# Patient Record
Sex: MALE | Race: WHITE | NOT HISPANIC OR LATINO | Employment: FULL TIME | URBAN - METROPOLITAN AREA
[De-identification: names, ages, dates, MRNs, and addresses within clinical notes are randomized per-mention and may not be internally consistent; named-entity substitution may affect disease eponyms.]

---

## 2021-02-25 DIAGNOSIS — Z23 IMMUNIZATION DUE: ICD-10-CM

## 2021-11-15 ENCOUNTER — TRANSCRIBE ORDERS (OUTPATIENT)
Dept: OTHER | Facility: OTHER | Age: 61
End: 2021-11-15

## 2021-11-15 ENCOUNTER — HOSPITAL ENCOUNTER (OUTPATIENT)
Dept: GENERAL RADIOLOGY | Facility: HOSPITAL | Age: 61
Discharge: HOME | End: 2021-11-15
Admitting: NURSE PRACTITIONER

## 2021-11-15 DIAGNOSIS — M54.50 LOW BACK PAIN, UNSPECIFIED BACK PAIN LATERALITY, UNSPECIFIED CHRONICITY, UNSPECIFIED WHETHER SCIATICA PRESENT: ICD-10-CM

## 2021-11-15 DIAGNOSIS — M54.50 LOW BACK PAIN, UNSPECIFIED BACK PAIN LATERALITY, UNSPECIFIED CHRONICITY, UNSPECIFIED WHETHER SCIATICA PRESENT: Primary | ICD-10-CM

## 2021-11-15 PROCEDURE — 72110 X-RAY EXAM L-2 SPINE 4/>VWS: CPT

## 2021-11-15 PROCEDURE — 72202 X-RAY EXAM SI JOINTS 3/> VWS: CPT | Performed by: RADIOLOGY

## 2021-11-15 PROCEDURE — 72110 X-RAY EXAM L-2 SPINE 4/>VWS: CPT | Performed by: RADIOLOGY

## 2021-11-15 PROCEDURE — 72202 X-RAY EXAM SI JOINTS 3/> VWS: CPT

## 2024-09-27 ENCOUNTER — APPOINTMENT (OUTPATIENT)
Dept: GENERAL RADIOLOGY | Facility: HOSPITAL | Age: 64
End: 2024-09-27
Payer: COMMERCIAL

## 2024-09-27 ENCOUNTER — HOSPITAL ENCOUNTER (INPATIENT)
Facility: HOSPITAL | Age: 64
LOS: 3 days | Discharge: HOME | End: 2024-09-30
Attending: FAMILY MEDICINE | Admitting: FAMILY MEDICINE
Payer: COMMERCIAL

## 2024-09-27 ENCOUNTER — APPOINTMENT (OUTPATIENT)
Dept: ULTRASOUND IMAGING | Facility: HOSPITAL | Age: 64
End: 2024-09-27
Payer: COMMERCIAL

## 2024-09-27 ENCOUNTER — APPOINTMENT (OUTPATIENT)
Dept: CT IMAGING | Facility: HOSPITAL | Age: 64
End: 2024-09-27
Payer: COMMERCIAL

## 2024-09-27 DIAGNOSIS — I26.99 OTHER ACUTE PULMONARY EMBOLISM WITHOUT ACUTE COR PULMONALE: ICD-10-CM

## 2024-09-27 DIAGNOSIS — I26.99 OTHER ACUTE PULMONARY EMBOLISM, UNSPECIFIED WHETHER ACUTE COR PULMONALE PRESENT: Primary | ICD-10-CM

## 2024-09-27 LAB
A-A DO2: 46.6 MMHG (ref 0–300)
ALBUMIN SERPL-MCNC: 4 G/DL (ref 3.5–5.2)
ALBUMIN/GLOB SERPL: 1.2 G/DL
ALP SERPL-CCNC: 92 U/L (ref 39–117)
ALT SERPL W P-5'-P-CCNC: 30 U/L (ref 1–41)
ANION GAP SERPL CALCULATED.3IONS-SCNC: 12.1 MMOL/L (ref 5–15)
APTT PPP: 29.6 SECONDS (ref 26.5–34.5)
ARTERIAL PATENCY WRIST A: ABNORMAL
AST SERPL-CCNC: 21 U/L (ref 1–40)
ATMOSPHERIC PRESS: 704 MMHG
BASE EXCESS BLDA CALC-SCNC: -0.2 MMOL/L (ref 0–2)
BASOPHILS # BLD AUTO: 0.07 10*3/MM3 (ref 0–0.2)
BASOPHILS # BLD AUTO: 0.08 10*3/MM3 (ref 0–0.2)
BASOPHILS NFR BLD AUTO: 0.5 % (ref 0–1.5)
BASOPHILS NFR BLD AUTO: 0.6 % (ref 0–1.5)
BDY SITE: ABNORMAL
BILIRUB SERPL-MCNC: 1.1 MG/DL (ref 0–1.2)
BUN SERPL-MCNC: 15 MG/DL (ref 8–23)
BUN/CREAT SERPL: 14 (ref 7–25)
CALCIUM SPEC-SCNC: 9.3 MG/DL (ref 8.6–10.5)
CHLORIDE SERPL-SCNC: 104 MMOL/L (ref 98–107)
CO2 BLDA-SCNC: 25 MMOL/L (ref 22–33)
CO2 SERPL-SCNC: 20.9 MMOL/L (ref 22–29)
COHGB MFR BLD: 1.5 % (ref 0–5)
CREAT SERPL-MCNC: 1.07 MG/DL (ref 0.76–1.27)
D DIMER PPP FEU-MCNC: 0.86 MCGFEU/ML (ref 0–0.64)
DEPRECATED RDW RBC AUTO: 41.2 FL (ref 37–54)
DEPRECATED RDW RBC AUTO: 41.5 FL (ref 37–54)
EGFRCR SERPLBLD CKD-EPI 2021: 77.5 ML/MIN/1.73
EOSINOPHIL # BLD AUTO: 0.02 10*3/MM3 (ref 0–0.4)
EOSINOPHIL # BLD AUTO: 0.13 10*3/MM3 (ref 0–0.4)
EOSINOPHIL NFR BLD AUTO: 0.1 % (ref 0.3–6.2)
EOSINOPHIL NFR BLD AUTO: 1 % (ref 0.3–6.2)
ERYTHROCYTE [DISTWIDTH] IN BLOOD BY AUTOMATED COUNT: 11.9 % (ref 12.3–15.4)
ERYTHROCYTE [DISTWIDTH] IN BLOOD BY AUTOMATED COUNT: 12.1 % (ref 12.3–15.4)
GEN 5 2HR TROPONIN T REFLEX: 10 NG/L
GLOBULIN UR ELPH-MCNC: 3.3 GM/DL
GLUCOSE SERPL-MCNC: 111 MG/DL (ref 65–99)
HCO3 BLDA-SCNC: 23.8 MMOL/L (ref 20–26)
HCT VFR BLD AUTO: 38.7 % (ref 37.5–51)
HCT VFR BLD AUTO: 41.4 % (ref 37.5–51)
HCT VFR BLD CALC: 42.5 % (ref 38–51)
HGB BLD-MCNC: 13.1 G/DL (ref 13–17.7)
HGB BLD-MCNC: 14 G/DL (ref 13–17.7)
HGB BLDA-MCNC: 13.9 G/DL (ref 14–18)
HOLD SPECIMEN: NORMAL
HOLD SPECIMEN: NORMAL
IMM GRANULOCYTES # BLD AUTO: 0.05 10*3/MM3 (ref 0–0.05)
IMM GRANULOCYTES # BLD AUTO: 0.06 10*3/MM3 (ref 0–0.05)
IMM GRANULOCYTES NFR BLD AUTO: 0.4 % (ref 0–0.5)
IMM GRANULOCYTES NFR BLD AUTO: 0.5 % (ref 0–0.5)
INHALED O2 CONCENTRATION: 21 %
INR PPP: 0.94 (ref 0.9–1.1)
LYMPHOCYTES # BLD AUTO: 2.66 10*3/MM3 (ref 0.7–3.1)
LYMPHOCYTES # BLD AUTO: 2.78 10*3/MM3 (ref 0.7–3.1)
LYMPHOCYTES NFR BLD AUTO: 19.3 % (ref 19.6–45.3)
LYMPHOCYTES NFR BLD AUTO: 21.1 % (ref 19.6–45.3)
Lab: ABNORMAL
Lab: ABNORMAL
MCH RBC QN AUTO: 31.6 PG (ref 26.6–33)
MCH RBC QN AUTO: 31.7 PG (ref 26.6–33)
MCHC RBC AUTO-ENTMCNC: 33.8 G/DL (ref 31.5–35.7)
MCHC RBC AUTO-ENTMCNC: 33.9 G/DL (ref 31.5–35.7)
MCV RBC AUTO: 93.3 FL (ref 79–97)
MCV RBC AUTO: 93.9 FL (ref 79–97)
METHGB BLD QL: 0.1 % (ref 0–3)
MODALITY: ABNORMAL
MONOCYTES # BLD AUTO: 1.73 10*3/MM3 (ref 0.1–0.9)
MONOCYTES # BLD AUTO: 1.83 10*3/MM3 (ref 0.1–0.9)
MONOCYTES NFR BLD AUTO: 12.5 % (ref 5–12)
MONOCYTES NFR BLD AUTO: 13.9 % (ref 5–12)
NEUTROPHILS NFR BLD AUTO: 62.9 % (ref 42.7–76)
NEUTROPHILS NFR BLD AUTO: 67.2 % (ref 42.7–76)
NEUTROPHILS NFR BLD AUTO: 8.31 10*3/MM3 (ref 1.7–7)
NEUTROPHILS NFR BLD AUTO: 9.28 10*3/MM3 (ref 1.7–7)
NOTIFIED BY: ABNORMAL
NOTIFIED WHO: ABNORMAL
NRBC BLD AUTO-RTO: 0 /100 WBC (ref 0–0.2)
NRBC BLD AUTO-RTO: 0 /100 WBC (ref 0–0.2)
NT-PROBNP SERPL-MCNC: 67.6 PG/ML (ref 0–900)
OXYHGB MFR BLDV: 85.8 % (ref 94–99)
PCO2 BLDA: 36.4 MM HG (ref 35–45)
PCO2 TEMP ADJ BLD: ABNORMAL MM[HG]
PH BLDA: 7.42 PH UNITS (ref 7.35–7.45)
PH, TEMP CORRECTED: ABNORMAL
PLATELET # BLD AUTO: 224 10*3/MM3 (ref 140–450)
PLATELET # BLD AUTO: 241 10*3/MM3 (ref 140–450)
PMV BLD AUTO: 8.8 FL (ref 6–12)
PMV BLD AUTO: 9.1 FL (ref 6–12)
PO2 BLDA: 50.3 MM HG (ref 83–108)
PO2 TEMP ADJ BLD: ABNORMAL MM[HG]
POTASSIUM SERPL-SCNC: 4.2 MMOL/L (ref 3.5–5.2)
PROT SERPL-MCNC: 7.3 G/DL (ref 6–8.5)
PROTHROMBIN TIME: 12.7 SECONDS (ref 12.1–14.7)
QT INTERVAL: 368 MS
QT INTERVAL: 374 MS
QTC INTERVAL: 467 MS
QTC INTERVAL: 479 MS
RBC # BLD AUTO: 4.15 10*6/MM3 (ref 4.14–5.8)
RBC # BLD AUTO: 4.41 10*6/MM3 (ref 4.14–5.8)
SAO2 % BLDCOA: 87.2 % (ref 94–99)
SODIUM SERPL-SCNC: 137 MMOL/L (ref 136–145)
TROPONIN T DELTA: 0 NG/L
TROPONIN T SERPL HS-MCNC: 10 NG/L
UFH PPP CHRO-ACNC: 0.7 IU/ML (ref 0.3–0.7)
UFH PPP CHRO-ACNC: <0.1 IU/ML (ref 0.3–0.7)
VENTILATOR MODE: ABNORMAL
WBC NRBC COR # BLD AUTO: 13.19 10*3/MM3 (ref 3.4–10.8)
WBC NRBC COR # BLD AUTO: 13.81 10*3/MM3 (ref 3.4–10.8)
WHOLE BLOOD HOLD COAG: NORMAL
WHOLE BLOOD HOLD SPECIMEN: NORMAL

## 2024-09-27 PROCEDURE — 84484 ASSAY OF TROPONIN QUANT: CPT | Performed by: FAMILY MEDICINE

## 2024-09-27 PROCEDURE — 85025 COMPLETE CBC W/AUTO DIFF WBC: CPT | Performed by: FAMILY MEDICINE

## 2024-09-27 PROCEDURE — 82375 ASSAY CARBOXYHB QUANT: CPT

## 2024-09-27 PROCEDURE — 71275 CT ANGIOGRAPHY CHEST: CPT | Performed by: RADIOLOGY

## 2024-09-27 PROCEDURE — 36415 COLL VENOUS BLD VENIPUNCTURE: CPT

## 2024-09-27 PROCEDURE — 99223 1ST HOSP IP/OBS HIGH 75: CPT

## 2024-09-27 PROCEDURE — 25010000002 MORPHINE PER 10 MG: Performed by: NURSE PRACTITIONER

## 2024-09-27 PROCEDURE — 93005 ELECTROCARDIOGRAM TRACING: CPT | Performed by: FAMILY MEDICINE

## 2024-09-27 PROCEDURE — 71045 X-RAY EXAM CHEST 1 VIEW: CPT | Performed by: RADIOLOGY

## 2024-09-27 PROCEDURE — 76705 ECHO EXAM OF ABDOMEN: CPT

## 2024-09-27 PROCEDURE — 71045 X-RAY EXAM CHEST 1 VIEW: CPT

## 2024-09-27 PROCEDURE — 85610 PROTHROMBIN TIME: CPT | Performed by: NURSE PRACTITIONER

## 2024-09-27 PROCEDURE — 25510000001 IOPAMIDOL PER 1 ML: Performed by: FAMILY MEDICINE

## 2024-09-27 PROCEDURE — 76705 ECHO EXAM OF ABDOMEN: CPT | Performed by: RADIOLOGY

## 2024-09-27 PROCEDURE — 25010000002 HEPARIN (PORCINE) 25000-0.45 UT/250ML-% SOLUTION: Performed by: NURSE PRACTITIONER

## 2024-09-27 PROCEDURE — 71275 CT ANGIOGRAPHY CHEST: CPT

## 2024-09-27 PROCEDURE — 85730 THROMBOPLASTIN TIME PARTIAL: CPT | Performed by: NURSE PRACTITIONER

## 2024-09-27 PROCEDURE — 85025 COMPLETE CBC W/AUTO DIFF WBC: CPT | Performed by: NURSE PRACTITIONER

## 2024-09-27 PROCEDURE — 25010000002 HYDROMORPHONE PER 4 MG: Performed by: NURSE PRACTITIONER

## 2024-09-27 PROCEDURE — 25010000002 HYDROMORPHONE 1 MG/ML SOLUTION: Performed by: NURSE PRACTITIONER

## 2024-09-27 PROCEDURE — 36600 WITHDRAWAL OF ARTERIAL BLOOD: CPT

## 2024-09-27 PROCEDURE — 25010000002 ONDANSETRON PER 1 MG: Performed by: NURSE PRACTITIONER

## 2024-09-27 PROCEDURE — 85520 HEPARIN ASSAY: CPT | Performed by: NURSE PRACTITIONER

## 2024-09-27 PROCEDURE — 25010000002 HEPARIN (PORCINE) PER 1000 UNITS: Performed by: NURSE PRACTITIONER

## 2024-09-27 PROCEDURE — 93010 ELECTROCARDIOGRAM REPORT: CPT | Performed by: INTERNAL MEDICINE

## 2024-09-27 PROCEDURE — 25810000003 SODIUM CHLORIDE 0.9 % SOLUTION: Performed by: FAMILY MEDICINE

## 2024-09-27 PROCEDURE — 80053 COMPREHEN METABOLIC PANEL: CPT | Performed by: FAMILY MEDICINE

## 2024-09-27 PROCEDURE — 85520 HEPARIN ASSAY: CPT | Performed by: FAMILY MEDICINE

## 2024-09-27 PROCEDURE — 99285 EMERGENCY DEPT VISIT HI MDM: CPT

## 2024-09-27 PROCEDURE — 83050 HGB METHEMOGLOBIN QUAN: CPT

## 2024-09-27 PROCEDURE — 81241 F5 GENE: CPT | Performed by: FAMILY MEDICINE

## 2024-09-27 PROCEDURE — 85520 HEPARIN ASSAY: CPT

## 2024-09-27 PROCEDURE — 83880 ASSAY OF NATRIURETIC PEPTIDE: CPT | Performed by: NURSE PRACTITIONER

## 2024-09-27 PROCEDURE — 85379 FIBRIN DEGRADATION QUANT: CPT | Performed by: PHYSICIAN ASSISTANT

## 2024-09-27 PROCEDURE — 82805 BLOOD GASES W/O2 SATURATION: CPT

## 2024-09-27 RX ORDER — HYDROMORPHONE HYDROCHLORIDE 1 MG/ML
0.5 INJECTION, SOLUTION INTRAMUSCULAR; INTRAVENOUS; SUBCUTANEOUS ONCE
Status: COMPLETED | OUTPATIENT
Start: 2024-09-27 | End: 2024-09-27

## 2024-09-27 RX ORDER — ASPIRIN 81 MG/1
324 TABLET, CHEWABLE ORAL ONCE
Status: COMPLETED | OUTPATIENT
Start: 2024-09-27 | End: 2024-09-27

## 2024-09-27 RX ORDER — SODIUM CHLORIDE 0.9 % (FLUSH) 0.9 %
10 SYRINGE (ML) INJECTION AS NEEDED
Status: DISCONTINUED | OUTPATIENT
Start: 2024-09-27 | End: 2024-09-30 | Stop reason: HOSPADM

## 2024-09-27 RX ORDER — BISACODYL 10 MG
10 SUPPOSITORY, RECTAL RECTAL DAILY PRN
Status: DISCONTINUED | OUTPATIENT
Start: 2024-09-27 | End: 2024-09-30 | Stop reason: HOSPADM

## 2024-09-27 RX ORDER — SODIUM CHLORIDE 9 MG/ML
40 INJECTION, SOLUTION INTRAVENOUS AS NEEDED
Status: DISCONTINUED | OUTPATIENT
Start: 2024-09-27 | End: 2024-09-30 | Stop reason: HOSPADM

## 2024-09-27 RX ORDER — ACETAMINOPHEN 325 MG/1
650 TABLET ORAL EVERY 6 HOURS PRN
Status: DISCONTINUED | OUTPATIENT
Start: 2024-09-27 | End: 2024-09-30 | Stop reason: HOSPADM

## 2024-09-27 RX ORDER — HEPARIN SODIUM 5000 [USP'U]/ML
4000 INJECTION, SOLUTION INTRAVENOUS; SUBCUTANEOUS AS NEEDED
Status: DISCONTINUED | OUTPATIENT
Start: 2024-09-27 | End: 2024-09-27

## 2024-09-27 RX ORDER — SODIUM CHLORIDE 9 MG/ML
100 INJECTION, SOLUTION INTRAVENOUS CONTINUOUS
Status: DISCONTINUED | OUTPATIENT
Start: 2024-09-27 | End: 2024-09-30

## 2024-09-27 RX ORDER — NITROGLYCERIN 0.4 MG/1
0.4 TABLET SUBLINGUAL
Status: DISCONTINUED | OUTPATIENT
Start: 2024-09-27 | End: 2024-09-30 | Stop reason: HOSPADM

## 2024-09-27 RX ORDER — LISINOPRIL 20 MG/1
20 TABLET ORAL DAILY
COMMUNITY

## 2024-09-27 RX ORDER — HEPARIN SODIUM 10000 [USP'U]/100ML
18.4 INJECTION, SOLUTION INTRAVENOUS
Status: DISCONTINUED | OUTPATIENT
Start: 2024-09-27 | End: 2024-09-30

## 2024-09-27 RX ORDER — BISACODYL 5 MG/1
5 TABLET, DELAYED RELEASE ORAL DAILY PRN
Status: DISCONTINUED | OUTPATIENT
Start: 2024-09-27 | End: 2024-09-30 | Stop reason: HOSPADM

## 2024-09-27 RX ORDER — AMOXICILLIN 250 MG
2 CAPSULE ORAL 2 TIMES DAILY PRN
Status: DISCONTINUED | OUTPATIENT
Start: 2024-09-27 | End: 2024-09-30 | Stop reason: HOSPADM

## 2024-09-27 RX ORDER — POLYETHYLENE GLYCOL 3350 17 G/17G
17 POWDER, FOR SOLUTION ORAL DAILY PRN
Status: DISCONTINUED | OUTPATIENT
Start: 2024-09-27 | End: 2024-09-30 | Stop reason: HOSPADM

## 2024-09-27 RX ORDER — IOPAMIDOL 755 MG/ML
100 INJECTION, SOLUTION INTRAVASCULAR
Status: COMPLETED | OUTPATIENT
Start: 2024-09-27 | End: 2024-09-27

## 2024-09-27 RX ORDER — CHOLECALCIFEROL (VITAMIN D3) 25 MCG
2000 TABLET ORAL DAILY
Status: DISCONTINUED | OUTPATIENT
Start: 2024-09-28 | End: 2024-09-30 | Stop reason: HOSPADM

## 2024-09-27 RX ORDER — SODIUM CHLORIDE 0.9 % (FLUSH) 0.9 %
10 SYRINGE (ML) INJECTION EVERY 12 HOURS SCHEDULED
Status: DISCONTINUED | OUTPATIENT
Start: 2024-09-27 | End: 2024-09-30 | Stop reason: HOSPADM

## 2024-09-27 RX ORDER — HYDROCODONE BITARTRATE AND ACETAMINOPHEN 5; 325 MG/1; MG/1
1 TABLET ORAL ONCE AS NEEDED
Status: COMPLETED | OUTPATIENT
Start: 2024-09-27 | End: 2024-09-27

## 2024-09-27 RX ORDER — CHOLECALCIFEROL (VITAMIN D3) 25 MCG
2000 TABLET ORAL DAILY
COMMUNITY

## 2024-09-27 RX ORDER — HEPARIN SODIUM 5000 [USP'U]/ML
2000 INJECTION, SOLUTION INTRAVENOUS; SUBCUTANEOUS AS NEEDED
Status: DISCONTINUED | OUTPATIENT
Start: 2024-09-27 | End: 2024-09-27

## 2024-09-27 RX ORDER — ONDANSETRON 2 MG/ML
4 INJECTION INTRAMUSCULAR; INTRAVENOUS ONCE
Status: COMPLETED | OUTPATIENT
Start: 2024-09-27 | End: 2024-09-27

## 2024-09-27 RX ORDER — HEPARIN SODIUM 5000 [USP'U]/ML
80 INJECTION, SOLUTION INTRAVENOUS; SUBCUTANEOUS ONCE
Status: COMPLETED | OUTPATIENT
Start: 2024-09-27 | End: 2024-09-27

## 2024-09-27 RX ORDER — LISINOPRIL 10 MG/1
20 TABLET ORAL DAILY
Status: DISCONTINUED | OUTPATIENT
Start: 2024-09-28 | End: 2024-09-30 | Stop reason: HOSPADM

## 2024-09-27 RX ADMIN — HYDROMORPHONE HYDROCHLORIDE 0.5 MG: 1 INJECTION, SOLUTION INTRAMUSCULAR; INTRAVENOUS; SUBCUTANEOUS at 11:18

## 2024-09-27 RX ADMIN — ASPIRIN 324 MG: 81 TABLET, CHEWABLE ORAL at 09:27

## 2024-09-27 RX ADMIN — SODIUM CHLORIDE 100 ML/HR: 9 INJECTION, SOLUTION INTRAVENOUS at 21:19

## 2024-09-27 RX ADMIN — HYDROMORPHONE HYDROCHLORIDE 0.5 MG: 1 INJECTION, SOLUTION INTRAMUSCULAR; INTRAVENOUS; SUBCUTANEOUS at 12:15

## 2024-09-27 RX ADMIN — HYDROCODONE BITARTRATE AND ACETAMINOPHEN 1 TABLET: 5; 325 TABLET ORAL at 23:32

## 2024-09-27 RX ADMIN — Medication 10 ML: at 21:20

## 2024-09-27 RX ADMIN — ACETAMINOPHEN 650 MG: 325 TABLET ORAL at 21:27

## 2024-09-27 RX ADMIN — MORPHINE SULFATE 4 MG: 4 INJECTION, SOLUTION INTRAMUSCULAR; INTRAVENOUS at 10:25

## 2024-09-27 RX ADMIN — HEPARIN SODIUM 15.4 UNITS/KG/HR: 10000 INJECTION, SOLUTION INTRAVENOUS at 12:10

## 2024-09-27 RX ADMIN — IOPAMIDOL 70 ML: 755 INJECTION, SOLUTION INTRAVENOUS at 10:41

## 2024-09-27 RX ADMIN — HYDROMORPHONE HYDROCHLORIDE 0.5 MG: 1 INJECTION, SOLUTION INTRAMUSCULAR; INTRAVENOUS; SUBCUTANEOUS at 20:08

## 2024-09-27 RX ADMIN — HYDROMORPHONE HYDROCHLORIDE 1 MG: 1 INJECTION, SOLUTION INTRAMUSCULAR; INTRAVENOUS; SUBCUTANEOUS at 14:30

## 2024-09-27 RX ADMIN — HEPARIN SODIUM 7800 UNITS: 5000 INJECTION INTRAVENOUS; SUBCUTANEOUS at 12:09

## 2024-09-27 RX ADMIN — ONDANSETRON 4 MG: 2 INJECTION INTRAMUSCULAR; INTRAVENOUS at 10:27

## 2024-09-27 NOTE — PROGRESS NOTES
HEPARIN INFUSION  Don Tavarez is a  64 y.o. male receiving heparin infusion.     Therapy for (VTE/Cardiac):   VTE  Patient Dosing Weight: 97.5 kg  Initial Bolus (Y/N):   Y  Any Bolus (Y/N):   Y        Signs or Symptoms of Bleeding: N        VTE (PE/DVT)   Initial Bolus: 80 units/kg (Max 10,000 units)  Initial rate: 18 units/kg/hr (Max 1,500 units/hr)    Anti Xa Rebolus Infusion Hold time Change infusion Dose (Units/kg/hr) Next Anti Xa Level Due   < 0.11 50 Units/kg  (4000 Units Max) None Increase by  4 Units/kg/hr 6 hours   0.11 - 0.19 25 Units/kg  (2000 Units Max) None Increase by  3 Units/kg/hr 6 hours   0.2 - 0.29 0 None Increase by  2 Units/kg/hr 6 hours   0.3 - 0.7 0 None No Change 6 hours (after 2 consecutive levels in range check q24h @0700)   0.71 - 0.8 0 None Decrease by  1 Units/kg/hr 6 hours   0.81 - 0.9 0 None Decrease by  2 Units/kg/hr 6 hours   0.91 - 1 0 60 Minutes Decrease by  3 Units/kg/hr 6 hours   >1 0 Hold  After Anti Xa less than 0.7 decrease previous rate by  4 Units/kg/hr  Every 2 hours until Anti Xa is less than 0.7 then when infusion restarts in 6 hours     Recommend anti-Xa every 6 hours.          Date   Time   Anti-Xa Current Rate (Unit/kg/hr) Bolus   (Units) Rate Change   (Unit/kg/hr) New Rate (Unit/kg/hr) Next   Anti-Xa Comments  Pump Check Daily   9/27 1122 <0.1  7800  15.4 1800 D/w JOSESITO Donis                                                                                                                                                                                                                                 Pharmacy will continue to follow anti-Xa results and monitor for signs and symptoms of bleeding or thrombosis.    Machelle Soto, PharmD  09/27/24 11:09 EDT

## 2024-09-27 NOTE — H&P
[facility] Medicine Services  History & Physical    Patient Identification:  Name:  Don Tavarez  Age:  64 y.o.  Sex:  male  :  1960  MRN:  [id number]   Visit Number:  [id number]  Admit Date: 2024   Primary Care Physician:  Jeanna eLvi APRN    Subjective     Chief complaint: Chest pain    History of presenting illness:      Don Tavarez is a 64 y.o. male who presented for further evaluation of chest pain.  He was seen and examined in the ED with son at the bedside.  He was uncomfortable appearing, having difficulty taking a deep breath and speaking in complete sentences.  He reports onset of right sided chest pain yesterday.  He reports a stabbing, sharp sensation-worse with breathing.  No cough or sputum production reported.  No recent illness. Was exposed to Covid 19 about two weeks ago but never symptomatic himself. No recent lower extremity edema.  No recent travel.  No prolonged immobilization or recent surgeries.  He is not a smoker.  No history of blood clots.  He reports he is otherwise generally healthy-only takes daily medication for blood pressure and arthritis.  No recent palpitations or chest pain otherwise.  Systems obtained and otherwise negative, see below.    Past medical history is significant for hypertension, arthritis    Upon arrival to the ED, vital signs were temp 98.7, heart rate 96, respirations 14, /80, SpO2 93% on room air.  ABG in the ED showed pO2 50.3 on room air.  HS troponin negative x 2.  D-dimer was elevated at 0.86.  White blood cell count is elevated at 13.19 without left shift.  CXR only notable for atelectasis, gallbladder US unremarkable.  CT PE protocol showed large pulmonary embolism right pulmonary artery.  EKG showed right bundle branch block.    Known Emergency Department medications received prior to my evaluation included aspirin, heparin, Dilaudid, Isovue, morphine, Zofran, heparin drip.   Emergency Department Room  location at the time of my evaluation was 403.     ---------------------------------------------------------------------------------------------------------------------   Review of Systems   Constitutional:  Negative for chills and fever.   HENT:  Negative for congestion and rhinorrhea.    Respiratory:  Positive for shortness of breath. Negative for cough.    Cardiovascular:  Positive for chest pain. Negative for palpitations and leg swelling.   Gastrointestinal:  Negative for abdominal pain, diarrhea, nausea and vomiting.   Genitourinary:  Negative for difficulty urinating and dysuria.   Musculoskeletal:  Negative for arthralgias and myalgias.   Skin:  Negative for rash and wound.   Neurological:  Negative for dizziness and light-headedness.        ---------------------------------------------------------------------------------------------------------------------   No past medical history on file.  No past surgical history on file.  No family history on file.  Social History     Socioeconomic History    Marital status:      ---------------------------------------------------------------------------------------------------------------------   Allergies:  Patient has no known allergies.  ---------------------------------------------------------------------------------------------------------------------   Home medications:    Medications below are reported home medications pulling from within the system; at this time, these medications have not been reconciled unless otherwise specified and are in the verification process for further verifcation as current home medications.  (Not in a hospital admission)      Hospital Scheduled Meds:     heparin, 15.4 Units/kg/hr (Dosing Weight), Last Rate: 15.4 Units/kg/hr (09/27/24 1210)  Pharmacy to Dose Heparin,         Current listed hospital scheduled medications may not yet reflect those currently placed in orders that are signed and held awaiting patient's arrival to  floor.   ---------------------------------------------------------------------------------------------------------------------     Objective     Vital Signs:  Temp:  [98.7 °F (37.1 °C)] 98.7 °F (37.1 °C)  Heart Rate:  [89-98] 98  Resp:  [14-18] 18  BP: (121-160)/() 136/92      09/27/24  0847   Weight: 97.5 kg (215 lb)     Body mass index is 32.69 kg/m².  ---------------------------------------------------------------------------------------------------------------------       Physical Exam  Vitals and nursing note reviewed.   Constitutional:       General: He is not in acute distress.     Appearance: He is ill-appearing.   HENT:      Head: Normocephalic and atraumatic.   Eyes:      Extraocular Movements: Extraocular movements intact.   Cardiovascular:      Rate and Rhythm: Normal rate and regular rhythm.   Pulmonary:      Effort: Pulmonary effort is normal.      Breath sounds: Normal breath sounds.      Comments: Very low inspiratory volumes due to pain  Abdominal:      Tenderness: There is no abdominal tenderness. There is no guarding.      Comments: Firm   Musculoskeletal:      Right lower leg: No edema.      Left lower leg: No edema.   Skin:     General: Skin is warm and dry.   Neurological:      Mental Status: He is alert. Mental status is at baseline.   Psychiatric:         Mood and Affect: Mood normal.             ---------------------------------------------------------------------------------------------------------------------  EKG:        I have personally looked at the EKG.  ---------------------------------------------------------------------------------------------------------------------   Results from last 7 days   Lab Units 09/27/24  0918   WBC 10*3/mm3 13.19*   HEMOGLOBIN g/dL 14.0   HEMATOCRIT % 41.4   MCV fL 93.9   MCHC g/dL 33.8   PLATELETS 10*3/mm3 241   INR  0.94     Results from last 7 days   Lab Units 09/27/24  1102   PH, ARTERIAL pH units 7.424   PO2 ART mm Hg 50.3*   PCO2, ARTERIAL mm  "Hg 36.4   HCO3 ART mmol/L 23.8     Results from last 7 days   Lab Units 09/27/24  0918   SODIUM mmol/L 137   POTASSIUM mmol/L 4.2   CHLORIDE mmol/L 104   CO2 mmol/L 20.9*   BUN mg/dL 15   CREATININE mg/dL 1.07   CALCIUM mg/dL 9.3   GLUCOSE mg/dL 111*   ALBUMIN g/dL 4.0   BILIRUBIN mg/dL 1.1   ALK PHOS U/L 92   AST (SGOT) U/L 21   ALT (SGPT) U/L 30   Estimated Creatinine Clearance: 78.9 mL/min (by C-G formula based on SCr of 1.07 mg/dL).  No results found for: \"AMMONIA\"  Results from last 7 days   Lab Units 09/27/24  1122 09/27/24  0918   HSTROP T ng/L 10 10     Results from last 7 days   Lab Units 09/27/24 0918   PROBNP pg/mL 67.6     No results found for: \"HGBA1C\"  No results found for: \"TSH\", \"FREET4\"  No results found for: \"PREGTESTUR\", \"PREGSERUM\", \"HCG\", \"HCGQUANT\"  Pain Management Panel           No data to display              No results found for: \"BLOODCX\"  No results found for: \"URINECX\"  No results found for: \"WOUNDCX\"  No results found for: \"STOOLCX\"      ---------------------------------------------------------------------------------------------------------------------  Imaging Results (Last 7 Days)       Procedure Component Value Units Date/Time    CT Angiogram Chest Pulmonary Embolism [[id number]] Collected: 09/27/24 1047     Updated: 09/27/24 1051    Narrative:      CT ANGIOGRAM CHEST PULMONARY EMBOLISM-     CLINICAL INDICATION: chest pain/pain with inspiration        COMPARISON: 9/27/2024     PROCEDURE: Thin cut axial images were acquired through the pulmonary  vessels during the rapid infusion of IV contrast.     Additional 3-D reformatted images obtained via post-processing for  improved diagnostic accuracy and procedural planning.     Radiation dose reduction techniques were utilized per ALARA protocol.  Automated exposure control was initiated through either or CareDose or  DoseRigMogoTix software packages by  protocol.           FINDINGS: Large filling defect in the right main pulmonary " "artery  extending superiorly and inferiorly compatible with large right  pulmonary embolus..     Minimal consolidation peripherally in the right upper lobe which could  represent postobstructive process..     There is no mediastinal lymph node enlargement     No pericardial or pleural effusion.          Impression:      1. Large pulmonary embolus right main pulmonary artery  2. Results were relayed to the referring clinician.     This report was finalized on 9/27/2024 10:49 AM by Dr. Stevo Farmer MD.       US Gallbladder [[id number]] Collected: 09/27/24 0956     Updated: 09/27/24 0958    Narrative:      US GALLBLADDER-     CLINICAL INDICATION: Right-sided pain        COMPARISON: None immediately available         PROCEDURE AND FINDINGS:     Sonographic imaging of the gallbladder reveals no evidence of  cholelithiasis.  There is no pericholecystic fluid.  The wall of the gallbladder measures 2.01 mm.  The common bile duct measures 5.51 mm.             Impression:      Impression:   No evidence of cholelithiasis.     This report was finalized on 9/27/2024 9:56 AM by Dr. Stevo Farmer MD.       XR Chest 1 View [[id number]] Collected: 09/27/24 0934     Updated: 09/27/24 0937    Narrative:      XR CHEST 1 VW-     CLINICAL INDICATION: Chest Pain Triage Protocol        COMPARISON: 10/6/2015     TECHNIQUE: Single frontal view of the chest.     FINDINGS:     LUNGS: Minimal right basilar airspace disease, likely atelectasis     HEART AND MEDIASTINUM: Heart and mediastinal contours are unremarkable        SKELETON: Bony and soft tissue structures are unremarkable.             Impression:      Minimal right basilar airspace disease, likely atelectasis           This report was finalized on 9/27/2024 9:35 AM by Dr. Stevo Farmer MD.               Cultures:  No results found for: \"BLOODCX\", \"URINECX\", \"WOUNDCX\", \"MRSACX\", \"RESPCX\", \"STOOLCX\"    Last echocardiogram:          I have personally reviewed the above radiology images and " read the final radiology report on 09/27/24  ---------------------------------------------------------------------------------------------------------------------  Assessment / Plan     There are no active hospital problems to display for this patient.      ASSESSMENT/PLAN:    Large acute right-sided pulmonary embolism  Acute respiratory failure with hypoxia, due to above  Patient presented to the ED for further evaluation of right-sided sharp, stabbing chest pain worse on breathing times past 24 hours.  pO2 on arrival was 50.3 on room air.  D-dimer was elevated at 0.86 and CT PE protocol showed large pulmonary embolism right main pulmonary artery.  No comment on the right heart strain.  No risk factors identified, see HPI.  EKG showed right bundle branch block  Patient was placed on supplemental O2 and a heparin infusion was initiated  Will admit for further management  Continue heparin infusion for now-pharmacy to dose, assistance appreciated.  Monitor per protocol.  Plan to transition to DOAC if affordable prior to discharge  Evaluate further with TTE  Will control pain as able  Monitor respiratory status closely.  Titrate supplemental O2 as needed and wean to baseline as able.  Trend labs  Continue supportive care measures    Hypertension  Review and resume home medications as clinically indicated  ----------  -DVT prophylaxis: Heparin drip  -Activity: As tolerated  -Expected length of stay: INPATIENT status due to the need for care which can only be reasonably provided in an hospital setting such as aggressive/expedited ancillary services and/or consultation services, the necessity for IV medications, close physician monitoring and/or the possible need for procedures.  In such, I feel patient’s risk for adverse outcomes and need for care warrant INPATIENT evaluation and predict the patient’s care encounter to likely last beyond 2 midnights.   -Disposition  pending further clinical course and work up     High  risk secondary to large pulmonary embolism     There are no questions and answers to display.       Jay Jay Goodman PA-C   09/27/24  12:55 EDT

## 2024-09-27 NOTE — ED NOTES
"Pt reports sudden onset of shortness of breath that started yesterday  he stated \"I have extreme pain when I take a deep breath\"  "

## 2024-09-27 NOTE — ED NOTES
MEDICAL SCREENING:    Reason for Visit: Right-sided chest pain.  This is worse with breathing.    Patient initially seen in triage.  The patient was advised further evaluation and diagnostic testing will be needed, some of the treatment and testing will be initiated in the lobby in order to begin the process.  The patient will be returned to the waiting area for the time being and possibly be re-assessed by a subsequent ED provider.  The patient will be brought back to the treatment area in as timely manner as possible.       Cam Marrero PA  09/27/24 0994

## 2024-09-27 NOTE — ED PROVIDER NOTES
Subjective   History of Present Illness  Patient is 64-year-old male who presents today complaining of right-sided chest pain.  Patient reports pain started yesterday.  Patient reports he took some ibuprofen and it relieved somewhat but states he awoke with worsening pain in the right lower chest area.  Patient reports mild shortness of breath.  Patient denies any diaphoresis.  Patient denies any history of cardiac issues.  Patient reports that his shortness of breath did seem to get some worse with exertion.  Patient does report a past history of hypertension.  Patient denies any alleviating factors.  Patient denies trying any interventions other than the ibuprofen.        Review of Systems   Constitutional: Negative.    HENT: Negative.     Eyes: Negative.    Respiratory:  Positive for shortness of breath.    Cardiovascular:  Positive for chest pain.   Gastrointestinal: Negative.    Endocrine: Negative.    Genitourinary: Negative.    Musculoskeletal: Negative.    Skin: Negative.    Allergic/Immunologic: Negative.    Neurological: Negative.    Hematological: Negative.    Psychiatric/Behavioral: Negative.         Past Medical History:   Diagnosis Date    Arthritis     Hypertension        No Known Allergies    Past Surgical History:   Procedure Laterality Date    JOINT REPLACEMENT         History reviewed. No pertinent family history.    Social History     Socioeconomic History    Marital status:    Tobacco Use    Smoking status: Never     Passive exposure: Never    Smokeless tobacco: Never   Vaping Use    Vaping status: Never Used   Substance and Sexual Activity    Alcohol use: Never    Drug use: Never    Sexual activity: Defer           Objective   Physical Exam  Vitals and nursing note reviewed.   Constitutional:       Appearance: He is well-developed.   HENT:      Head: Normocephalic.      Right Ear: External ear normal.      Left Ear: External ear normal.   Eyes:      Conjunctiva/sclera: Conjunctivae  normal.      Pupils: Pupils are equal, round, and reactive to light.   Cardiovascular:      Rate and Rhythm: Normal rate and regular rhythm.      Heart sounds: Normal heart sounds.   Pulmonary:      Effort: Pulmonary effort is normal.      Comments: Decreased lung sounds right lower lung field  Abdominal:      General: Bowel sounds are normal.      Palpations: Abdomen is soft.   Musculoskeletal:         General: Normal range of motion.      Cervical back: Normal range of motion and neck supple.   Skin:     General: Skin is warm and dry.      Capillary Refill: Capillary refill takes less than 2 seconds.   Neurological:      Mental Status: He is alert and oriented to person, place, and time.   Psychiatric:         Behavior: Behavior normal.         Thought Content: Thought content normal.         Procedures           ED Course  ED Course as of 09/27/24 2228   Fri Sep 27, 2024   0936 EKG normal sinus rhythm.  Ventricular 99 parable 152  QTc 479 no ST elevation.Electronically signed by Ray Nuñez MD, 09/27/24, 9:36 AM EDT.   [BB]   1051 Repeat EKG normal sinus rhythm at rate 97 parable 174  QTc 467 left axis deviation no ST elevation. Electronically signed by Ray Nuñez MD, 09/27/24, 10:52 AM EDT.   [BB]      ED Course User Index  [BB] Ray Nuñez MD                                   Results for orders placed or performed during the hospital encounter of 09/27/24   Comprehensive Metabolic Panel    Specimen: Arm, Left; Blood   Result Value Ref Range    Glucose 111 (H) 65 - 99 mg/dL    BUN 15 8 - 23 mg/dL    Creatinine 1.07 0.76 - 1.27 mg/dL    Sodium 137 136 - 145 mmol/L    Potassium 4.2 3.5 - 5.2 mmol/L    Chloride 104 98 - 107 mmol/L    CO2 20.9 (L) 22.0 - 29.0 mmol/L    Calcium 9.3 8.6 - 10.5 mg/dL    Total Protein 7.3 6.0 - 8.5 g/dL    Albumin 4.0 3.5 - 5.2 g/dL    ALT (SGPT) 30 1 - 41 U/L    AST (SGOT) 21 1 - 40 U/L    Alkaline Phosphatase 92 39 - 117 U/L    Total Bilirubin 1.1  0.0 - 1.2 mg/dL    Globulin 3.3 gm/dL    A/G Ratio 1.2 g/dL    BUN/Creatinine Ratio 14.0 7.0 - 25.0    Anion Gap 12.1 5.0 - 15.0 mmol/L    eGFR 77.5 >60.0 mL/min/1.73   High Sensitivity Troponin T    Specimen: Arm, Left; Blood   Result Value Ref Range    HS Troponin T 10 <22 ng/L   CBC Auto Differential    Specimen: Arm, Left; Blood   Result Value Ref Range    WBC 13.19 (H) 3.40 - 10.80 10*3/mm3    RBC 4.41 4.14 - 5.80 10*6/mm3    Hemoglobin 14.0 13.0 - 17.7 g/dL    Hematocrit 41.4 37.5 - 51.0 %    MCV 93.9 79.0 - 97.0 fL    MCH 31.7 26.6 - 33.0 pg    MCHC 33.8 31.5 - 35.7 g/dL    RDW 11.9 (L) 12.3 - 15.4 %    RDW-SD 41.2 37.0 - 54.0 fl    MPV 9.1 6.0 - 12.0 fL    Platelets 241 140 - 450 10*3/mm3    Neutrophil % 62.9 42.7 - 76.0 %    Lymphocyte % 21.1 19.6 - 45.3 %    Monocyte % 13.9 (H) 5.0 - 12.0 %    Eosinophil % 1.0 0.3 - 6.2 %    Basophil % 0.6 0.0 - 1.5 %    Immature Grans % 0.5 0.0 - 0.5 %    Neutrophils, Absolute 8.31 (H) 1.70 - 7.00 10*3/mm3    Lymphocytes, Absolute 2.78 0.70 - 3.10 10*3/mm3    Monocytes, Absolute 1.83 (H) 0.10 - 0.90 10*3/mm3    Eosinophils, Absolute 0.13 0.00 - 0.40 10*3/mm3    Basophils, Absolute 0.08 0.00 - 0.20 10*3/mm3    Immature Grans, Absolute 0.06 (H) 0.00 - 0.05 10*3/mm3    nRBC 0.0 0.0 - 0.2 /100 WBC   D-dimer, Quantitative    Specimen: Arm, Left; Blood   Result Value Ref Range    D-Dimer, Quantitative 0.86 (H) 0.00 - 0.64 MCGFEU/mL   High Sensitivity Troponin T 2Hr    Specimen: Arm, Right; Blood   Result Value Ref Range    HS Troponin T 10 <22 ng/L    Troponin T Delta 0 >=-4 - <+4 ng/L   Heparin Anti-Xa    Specimen: Arm, Right; Blood   Result Value Ref Range    Heparin Anti-Xa (UFH) <0.10 (L) 0.30 - 0.70 IU/ml   Protime-INR    Specimen: Arm, Left; Blood   Result Value Ref Range    Protime 12.7 12.1 - 14.7 Seconds    INR 0.94 0.90 - 1.10   aPTT    Specimen: Arm, Left; Blood   Result Value Ref Range    PTT 29.6 26.5 - 34.5 seconds   BNP    Specimen: Arm, Left; Blood   Result  Value Ref Range    proBNP 67.6 0.0 - 900.0 pg/mL   Blood Gas, Arterial With Co-Ox    Specimen: Arterial Blood   Result Value Ref Range    Site Left Brachial     Jordan's Test N/A     pH, Arterial 7.424 7.350 - 7.450 pH units    pCO2, Arterial 36.4 35.0 - 45.0 mm Hg    pO2, Arterial 50.3 (C) 83.0 - 108.0 mm Hg    HCO3, Arterial 23.8 20.0 - 26.0 mmol/L    Base Excess, Arterial -0.2 (L) 0.0 - 2.0 mmol/L    O2 Saturation, Arterial 87.2 (L) 94.0 - 99.0 %    Hemoglobin, Blood Gas 13.9 (L) 14 - 18 g/dL    Hematocrit, Blood Gas 42.5 38.0 - 51.0 %    Oxyhemoglobin 85.8 (L) 94 - 99 %    Methemoglobin 0.10 0.00 - 3.00 %    Carboxyhemoglobin 1.5 0 - 5 %    A-a DO2 46.6 0.0 - 300.0 mmHg    CO2 Content 25.0 22 - 33 mmol/L    Barometric Pressure for Blood Gas 704 mmHg    Modality Room Air     FIO2 21 %    Ventilator Mode NA     Notified Who ER NP AND RN     Notified By [id number]     Notified Time 09/27/2024 11:05     Collected by [id number]     pH, Temp Corrected      pCO2, Temperature Corrected      pO2, Temperature Corrected     Heparin Anti-Xa    Specimen: Blood   Result Value Ref Range    Heparin Anti-Xa (UFH) 0.70 0.30 - 0.70 IU/ml   ECG 12 Lead ED Triage Standing Order; Chest Pain   Result Value Ref Range    QT Interval 374 ms    QTC Interval 479 ms   ECG 12 Lead Other; shortness of brearth   Result Value Ref Range    QT Interval 368 ms    QTC Interval 467 ms   Green Top (Gel)   Result Value Ref Range    Extra Tube Hold for add-ons.    Lavender Top   Result Value Ref Range    Extra Tube hold for add-on    Gold Top - SST   Result Value Ref Range    Extra Tube Hold for add-ons.    Light Blue Top   Result Value Ref Range    Extra Tube Hold for add-ons.      CT Angiogram Chest Pulmonary Embolism   Final Result   1. Large pulmonary embolus right main pulmonary artery   2. Results were relayed to the referring clinician.       This report was finalized on 9/27/2024 10:49 AM by Dr. Stevo Farmer MD.          US Gallbladder   Final Result    Impression:    No evidence of cholelithiasis.       This report was finalized on 9/27/2024 9:56 AM by Dr. Stevo Farmer MD.          XR Chest 1 View   Final Result   Minimal right basilar airspace disease, likely atelectasis               This report was finalized on 9/27/2024 9:35 AM by Dr. Stevo Farmer MD.                      Medical Decision Making  Patient is 64-year-old male who presents today complaining of right-sided chest pain.  Patient reports pain started yesterday.  Patient reports he took some ibuprofen and it relieved somewhat but states he awoke with worsening pain in the right lower chest area.  Patient reports mild shortness of breath.  Patient denies any diaphoresis.  Patient denies any history of cardiac issues.  Patient reports that his shortness of breath did seem to get some worse with exertion.  Patient does report a past history of hypertension.  Patient denies any alleviating factors.  Patient denies trying any interventions other than the ibuprofen.      Problems Addressed:  Other acute pulmonary embolism, unspecified whether acute cor pulmonale present: complicated acute illness or injury    Amount and/or Complexity of Data Reviewed  Labs: ordered. Decision-making details documented in ED Course.  Radiology: ordered. Decision-making details documented in ED Course.  ECG/medicine tests: ordered. Decision-making details documented in ED Course.    Risk  OTC drugs.  Prescription drug management.  Decision regarding hospitalization.        Final diagnoses:   Other acute pulmonary embolism, unspecified whether acute cor pulmonale present       ED Disposition  ED Disposition       ED Disposition   Decision to Admit    Condition   --    Comment   Level of Care: Telemetry [5]   Diagnosis: Pulmonary embolism [[id number]]   Admitting Physician: MICHAEL MATT [[id number]]   Certification: I Certify That Inpatient Hospital Services Are Medically Necessary For Greater Than 2 Midnights                 No  follow-up provider specified.       Medication List      No changes were made to your prescriptions during this visit.            Mariano Clayton, APRN  09/27/24 [id number]

## 2024-09-28 ENCOUNTER — APPOINTMENT (OUTPATIENT)
Dept: CARDIOLOGY | Facility: HOSPITAL | Age: 64
End: 2024-09-28
Payer: COMMERCIAL

## 2024-09-28 ENCOUNTER — APPOINTMENT (OUTPATIENT)
Dept: ULTRASOUND IMAGING | Facility: HOSPITAL | Age: 64
End: 2024-09-28
Payer: COMMERCIAL

## 2024-09-28 LAB
ANION GAP SERPL CALCULATED.3IONS-SCNC: 11.3 MMOL/L (ref 5–15)
BH CV ECHO MEAS - ACS: 1.8 CM
BH CV ECHO MEAS - AO MAX PG: 5.6 MMHG
BH CV ECHO MEAS - AO MEAN PG: 3 MMHG
BH CV ECHO MEAS - AO ROOT DIAM: 3.6 CM
BH CV ECHO MEAS - AO V2 MAX: 118 CM/SEC
BH CV ECHO MEAS - AO V2 VTI: 21.6 CM
BH CV ECHO MEAS - AVA(I,D): 3 CM2
BH CV ECHO MEAS - EDV(CUBED): 97.3 ML
BH CV ECHO MEAS - EDV(MOD-SP2): 79.8 ML
BH CV ECHO MEAS - EDV(MOD-SP4): 96.9 ML
BH CV ECHO MEAS - EF(MOD-BP): 63.9 %
BH CV ECHO MEAS - EF(MOD-SP2): 63.9 %
BH CV ECHO MEAS - EF(MOD-SP4): 60.5 %
BH CV ECHO MEAS - ESV(CUBED): 29.8 ML
BH CV ECHO MEAS - ESV(MOD-SP2): 28.8 ML
BH CV ECHO MEAS - ESV(MOD-SP4): 38.3 ML
BH CV ECHO MEAS - FS: 32.6 %
BH CV ECHO MEAS - IVS/LVPW: 1 CM
BH CV ECHO MEAS - IVSD: 1.2 CM
BH CV ECHO MEAS - LA DIMENSION: 4.8 CM
BH CV ECHO MEAS - LAT PEAK E' VEL: 9.3 CM/SEC
BH CV ECHO MEAS - LV MASS(C)D: 205 GRAMS
BH CV ECHO MEAS - LV MAX PG: 5.5 MMHG
BH CV ECHO MEAS - LV MEAN PG: 2 MMHG
BH CV ECHO MEAS - LV V1 MAX: 117 CM/SEC
BH CV ECHO MEAS - LV V1 VTI: 20.7 CM
BH CV ECHO MEAS - LVIDD: 4.6 CM
BH CV ECHO MEAS - LVIDS: 3.1 CM
BH CV ECHO MEAS - LVOT AREA: 3.1 CM2
BH CV ECHO MEAS - LVOT DIAM: 2 CM
BH CV ECHO MEAS - LVPWD: 1.2 CM
BH CV ECHO MEAS - MED PEAK E' VEL: 7.3 CM/SEC
BH CV ECHO MEAS - MV A MAX VEL: 66.4 CM/SEC
BH CV ECHO MEAS - MV DEC SLOPE: 417 CM/SEC2
BH CV ECHO MEAS - MV DEC TIME: 0.14 SEC
BH CV ECHO MEAS - MV E MAX VEL: 58.7 CM/SEC
BH CV ECHO MEAS - MV E/A: 0.88
BH CV ECHO MEAS - PA ACC TIME: 0.13 SEC
BH CV ECHO MEAS - RAP SYSTOLE: 10 MMHG
BH CV ECHO MEAS - RVSP: 38.5 MMHG
BH CV ECHO MEAS - SV(LVOT): 65 ML
BH CV ECHO MEAS - SV(MOD-SP2): 51 ML
BH CV ECHO MEAS - SV(MOD-SP4): 58.6 ML
BH CV ECHO MEAS - TAPSE (>1.6): 3.1 CM
BH CV ECHO MEAS - TR MAX PG: 28.5 MMHG
BH CV ECHO MEAS - TR MAX VEL: 267 CM/SEC
BH CV ECHO MEASUREMENTS AVERAGE E/E' RATIO: 7.07
BUN SERPL-MCNC: 16 MG/DL (ref 8–23)
BUN/CREAT SERPL: 16 (ref 7–25)
CALCIUM SPEC-SCNC: 8.7 MG/DL (ref 8.6–10.5)
CHLORIDE SERPL-SCNC: 102 MMOL/L (ref 98–107)
CO2 SERPL-SCNC: 21.7 MMOL/L (ref 22–29)
CREAT SERPL-MCNC: 1 MG/DL (ref 0.76–1.27)
EGFRCR SERPLBLD CKD-EPI 2021: 84 ML/MIN/1.73
GLUCOSE SERPL-MCNC: 110 MG/DL (ref 65–99)
LEFT ATRIUM VOLUME INDEX: 19.3 ML/M2
POTASSIUM SERPL-SCNC: 4.2 MMOL/L (ref 3.5–5.2)
SODIUM SERPL-SCNC: 135 MMOL/L (ref 136–145)
UFH PPP CHRO-ACNC: 0.14 IU/ML (ref 0.3–0.7)
UFH PPP CHRO-ACNC: 0.36 IU/ML (ref 0.3–0.7)
UFH PPP CHRO-ACNC: 0.41 IU/ML (ref 0.3–0.7)

## 2024-09-28 PROCEDURE — 85303 CLOT INHIBIT PROT C ACTIVITY: CPT | Performed by: FAMILY MEDICINE

## 2024-09-28 PROCEDURE — 93970 EXTREMITY STUDY: CPT | Performed by: RADIOLOGY

## 2024-09-28 PROCEDURE — 85520 HEPARIN ASSAY: CPT | Performed by: FAMILY MEDICINE

## 2024-09-28 PROCEDURE — 85305 CLOT INHIBIT PROT S TOTAL: CPT | Performed by: FAMILY MEDICINE

## 2024-09-28 PROCEDURE — 25010000002 HEPARIN (PORCINE) PER 1000 UNITS: Performed by: FAMILY MEDICINE

## 2024-09-28 PROCEDURE — 25010000002 HEPARIN (PORCINE) 25000-0.45 UT/250ML-% SOLUTION: Performed by: FAMILY MEDICINE

## 2024-09-28 PROCEDURE — 99232 SBSQ HOSP IP/OBS MODERATE 35: CPT | Performed by: FAMILY MEDICINE

## 2024-09-28 PROCEDURE — 81240 F2 GENE: CPT | Performed by: FAMILY MEDICINE

## 2024-09-28 PROCEDURE — 86148 ANTI-PHOSPHOLIPID ANTIBODY: CPT | Performed by: FAMILY MEDICINE

## 2024-09-28 PROCEDURE — 93306 TTE W/DOPPLER COMPLETE: CPT

## 2024-09-28 PROCEDURE — 85300 ANTITHROMBIN III ACTIVITY: CPT | Performed by: FAMILY MEDICINE

## 2024-09-28 PROCEDURE — 85220 BLOOC CLOT FACTOR V TEST: CPT | Performed by: FAMILY MEDICINE

## 2024-09-28 PROCEDURE — 85306 CLOT INHIBIT PROT S FREE: CPT | Performed by: FAMILY MEDICINE

## 2024-09-28 PROCEDURE — 85520 HEPARIN ASSAY: CPT

## 2024-09-28 PROCEDURE — 86147 CARDIOLIPIN ANTIBODY EA IG: CPT | Performed by: FAMILY MEDICINE

## 2024-09-28 PROCEDURE — 93970 EXTREMITY STUDY: CPT

## 2024-09-28 PROCEDURE — 93306 TTE W/DOPPLER COMPLETE: CPT | Performed by: SPECIALIST

## 2024-09-28 PROCEDURE — 25810000003 SODIUM CHLORIDE 0.9 % SOLUTION: Performed by: FAMILY MEDICINE

## 2024-09-28 RX ORDER — HEPARIN SODIUM 5000 [USP'U]/ML
2000 INJECTION, SOLUTION INTRAVENOUS; SUBCUTANEOUS ONCE
Status: COMPLETED | OUTPATIENT
Start: 2024-09-28 | End: 2024-09-28

## 2024-09-28 RX ORDER — HYDROCODONE BITARTRATE AND ACETAMINOPHEN 7.5; 325 MG/1; MG/1
1 TABLET ORAL EVERY 6 HOURS PRN
Status: DISCONTINUED | OUTPATIENT
Start: 2024-09-28 | End: 2024-09-30 | Stop reason: HOSPADM

## 2024-09-28 RX ADMIN — HYDROCODONE BITARTRATE AND ACETAMINOPHEN 1 TABLET: 7.5; 325 TABLET ORAL at 20:17

## 2024-09-28 RX ADMIN — HEPARIN SODIUM 2000 UNITS: 5000 INJECTION, SOLUTION INTRAVENOUS; SUBCUTANEOUS at 10:03

## 2024-09-28 RX ADMIN — SODIUM CHLORIDE 100 ML/HR: 9 INJECTION, SOLUTION INTRAVENOUS at 16:33

## 2024-09-28 RX ADMIN — Medication 2000 UNITS: at 08:15

## 2024-09-28 RX ADMIN — SODIUM CHLORIDE 100 ML/HR: 9 INJECTION, SOLUTION INTRAVENOUS at 06:41

## 2024-09-28 RX ADMIN — HEPARIN SODIUM 15.4 UNITS/KG/HR: 10000 INJECTION, SOLUTION INTRAVENOUS at 05:11

## 2024-09-28 RX ADMIN — ACETAMINOPHEN 650 MG: 325 TABLET ORAL at 08:22

## 2024-09-28 RX ADMIN — Medication 10 ML: at 20:17

## 2024-09-28 RX ADMIN — HEPARIN SODIUM 18.4 UNITS/KG/HR: 10000 INJECTION, SOLUTION INTRAVENOUS at 20:17

## 2024-09-28 RX ADMIN — LISINOPRIL 20 MG: 10 TABLET ORAL at 08:15

## 2024-09-28 RX ADMIN — HYDROCODONE BITARTRATE AND ACETAMINOPHEN 1 TABLET: 7.5; 325 TABLET ORAL at 11:39

## 2024-09-28 RX ADMIN — Medication 10 ML: at 08:18

## 2024-09-28 NOTE — PROGRESS NOTES
[facility] HOSPITALIST PROGRESS NOTE     Patient Identification:  Name:  Don Tavarez  Age:  64 y.o.  Sex:  male  :  1960  MRN:  [id number]  Visit Number:  [id number]  ROOM: [address] [place] [address]     Primary Care Provider:  Jeanna Levi APRN     Date of Admission: 2024    Length of stay in inpatient status:  1    Subjective     Chief Compliant:    Chief Complaint   Patient presents with    Chest Pain     History of Presenting Illness:    Patient says that he is breathing much better overall.  He also says that his right side pain is much improved.  Yesterday it was 10 out of 10 today is a 4 out of 10.  Patient's son had multiple questions about his father's condition, the treatment plan, and the physiology of his diagnosis.  Objective     Current Hospital Meds:  cholecalciferol, 2,000 Units, Oral, Daily  lisinopril, 20 mg, Oral, Daily  sodium chloride, 10 mL, Intravenous, Q12H    heparin, 15.4 Units/kg/hr (Dosing Weight), Last Rate: 15.4 Units/kg/hr (24 0816)  Pharmacy to Dose Heparin,   sodium chloride, 100 mL/hr, Last Rate: 100 mL/hr (24 0815)      Current Antimicrobial Therapy:  Anti-Infectives (From admission, onward)      None          Current Diuretic Therapy:  Diuretics (From admission, onward)      None          ----------------------------------------------------------------------------------------------------------------------  Vital Signs:  Temp:  [98.5 °F (36.9 °C)-102.6 °F (39.2 °C)] 98.5 °F (36.9 °C)  Heart Rate:  [] 104  Resp:  [18-20] 18  BP: (109-167)/() 131/77  SpO2:  [89 %-95 %] 95 %  on  Flow (L/min):  [2] 2;   Device (Oxygen Therapy): nasal cannula  Body mass index is 35.08 kg/m².    Wt Readings from Last 3 Encounters:   24 105 kg (230 lb 11.2 oz)   16 90.7 kg (200 lb)     Intake & Output (last 3 days)          07 07 07 07 07 0700    P.O.   240     I.V. (mL/kg)   1203.2  (12.3)     Total Intake(mL/kg)   1443.2 (14.8)     Urine (mL/kg/hr)   500     Total Output   500     Net   +943.2                   Diet: Diabetic; Consistent Carbohydrate; Fluid Consistency: Thin (IDDSI 0)  ----------------------------------------------------------------------------------------------------------------------  Physical Exam    Constitutional:  Well-developed and well-nourished.  No acute distress.      HENT:  Head:  Normocephalic and atraumatic.  Mouth:  Moist mucous membranes.    Eyes:  Conjunctivae and EOM are normal. No scleral icterus.    Neck:  Neck supple.  No JVD present.    Cardiovascular:  Normal rate, regular rhythm and normal heart sounds with no murmur.  Pulmonary/Chest:  No respiratory distress, no wheezes, no crackles, with normal breath sounds and good air movement.  Abdominal:  Soft. No distension and no tenderness.   Musculoskeletal:  No tenderness, and no deformity.  No red or swollen joints anywhere.    Neurological:  Alert and oriented to person, place, and time.  No cranial nerve deficit.    Skin:  Skin is warm and dry. No rash noted. No pallor.   Peripheral vascular:  No clubbing, no cyanosis, no edema.  Psychiatric: Appropriate mood and affect  :    ----------------------------------------------------------------------------------------------------------------------  Tele:    ----------------------------------------------------------------------------------------------------------------------  LABS:    CBC and coagulation:  Results from last 7 days   Lab Units 09/27/24  2351 09/27/24  0918   WBC 10*3/mm3 13.81* 13.19*   HEMOGLOBIN g/dL 13.1 14.0   HEMATOCRIT % 38.7 41.4   MCV fL 93.3 93.9   MCHC g/dL 33.9 33.8   PLATELETS 10*3/mm3 224 241   INR   --  0.94   D DIMER QUANT MCGFEU/mL  --  0.86*     Acid/base balance:  Results from last 7 days   Lab Units 09/27/24  1102   PH, ARTERIAL pH units 7.424   PO2 ART mm Hg 50.3*   PCO2, ARTERIAL mm Hg 36.4   HCO3 ART mmol/L 23.8  "    Renal and electrolytes:  Results from last 7 days   Lab Units 09/27/24 2351 09/27/24  0918   SODIUM mmol/L 135* 137   POTASSIUM mmol/L 4.2 4.2   CHLORIDE mmol/L 102 104   CO2 mmol/L 21.7* 20.9*   BUN mg/dL 16 15   CREATININE mg/dL 1.00 1.07   CALCIUM mg/dL 8.7 9.3   GLUCOSE mg/dL 110* 111*     Estimated Creatinine Clearance: 87.6 mL/min (by C-G formula based on SCr of 1 mg/dL).    Liver and pancreatic function:  Results from last 7 days   Lab Units 09/27/24 0918   ALBUMIN g/dL 4.0   BILIRUBIN mg/dL 1.1   ALK PHOS U/L 92   AST (SGOT) U/L 21   ALT (SGPT) U/L 30     Endocrine function:  No results found for: \"HGBA1C\"  Point of care bedside glucose levels:      Glucose levels from the [facility] [address] [facility]:  Results from last 7 days   Lab Units 09/27/24 2351 09/27/24 0918   GLUCOSE mg/dL 110* 111*     No results found for: \"TSH\", \"FREET4\"  Cardiac:  Results from last 7 days   Lab Units 09/27/24  1122 09/27/24 0918   HSTROP T ng/L 10 10   PROBNP pg/mL  --  67.6       Cultures:  No results found for: \"COLORU\", \"CLARITYU\", \"SPECGRAV\", \"PHUR\", \"PROTEINUR\", \"GLUCOSEU\", \"KETONESU\", \"BLOODU\", \"NITRITEU\", \"LEUKOCYTESUR\", \"BILIRUBINUR\", \"UROBILINOGEN\", \"RBCUA\", \"WBCUA\", \"BACTERIA\", \"UACOMMENT\"  Microbiology Results (last 10 days)       ** No results found for the last 240 hours. **            No results found for: \"PREGTESTUR\", \"PREGSERUM\", \"HCG\", \"HCGQUANT\"  Pain Management Panel           No data to display                I have personally looked at the labs and they are summarized above.  ----------------------------------------------------------------------------------------------------------------------  Detailed radiology reports for the last 24 hours:    Imaging Results (Last 24 Hours)       Procedure Component Value Units Date/Time    CT Angiogram Chest Pulmonary Embolism [[id number]] Collected: 09/27/24 1047     Updated: 09/27/24 1051    Narrative:      CT ANGIOGRAM CHEST PULMONARY EMBOLISM-     CLINICAL INDICATION: chest " pain/pain with inspiration        COMPARISON: 9/27/2024     PROCEDURE: Thin cut axial images were acquired through the pulmonary  vessels during the rapid infusion of IV contrast.     Additional 3-D reformatted images obtained via post-processing for  improved diagnostic accuracy and procedural planning.     Radiation dose reduction techniques were utilized per ALARA protocol.  Automated exposure control was initiated through either or FPSI or  DoseRight software packages by  protocol.           FINDINGS: Large filling defect in the right main pulmonary artery  extending superiorly and inferiorly compatible with large right  pulmonary embolus..     Minimal consolidation peripherally in the right upper lobe which could  represent postobstructive process..     There is no mediastinal lymph node enlargement     No pericardial or pleural effusion.          Impression:      1. Large pulmonary embolus right main pulmonary artery  2. Results were relayed to the referring clinician.     This report was finalized on 9/27/2024 10:49 AM by Dr. Stevo Farmer MD.       US Gallbladder [[id number]] Collected: 09/27/24 0956     Updated: 09/27/24 0958    Narrative:      US GALLBLADDER-     CLINICAL INDICATION: Right-sided pain        COMPARISON: None immediately available         PROCEDURE AND FINDINGS:     Sonographic imaging of the gallbladder reveals no evidence of  cholelithiasis.  There is no pericholecystic fluid.  The wall of the gallbladder measures 2.01 mm.  The common bile duct measures 5.51 mm.             Impression:      Impression:   No evidence of cholelithiasis.     This report was finalized on 9/27/2024 9:56 AM by Dr. Stevo Farmer MD.       XR Chest 1 View [[id number]] Collected: 09/27/24 0934     Updated: 09/27/24 0937    Narrative:      XR CHEST 1 VW-     CLINICAL INDICATION: Chest Pain Triage Protocol        COMPARISON: 10/6/2015     TECHNIQUE: Single frontal view of the chest.     FINDINGS:      LUNGS: Minimal right basilar airspace disease, likely atelectasis     HEART AND MEDIASTINUM: Heart and mediastinal contours are unremarkable        SKELETON: Bony and soft tissue structures are unremarkable.             Impression:      Minimal right basilar airspace disease, likely atelectasis           This report was finalized on 9/27/2024 9:35 AM by Dr. Stevo Farmer MD.             I have personally looked at the radiology images and I have read the available final and preliminary reports.    Assessment & Plan    Large acute right-sided pulmonary embolism  Acute respiratory failure with hypoxia  Patient was admitted to the hospitalist service  We will continue the patient on a heparin drip  Will get more aggressive with the patient's pain medication  Bilateral lower extremity venous Dopplers    Hypertension  I will review and reconcile patient's home medication    VTE Prophylaxis:   Active VTE Prophylaxis:  Pharmacologic:        Start     Dose Route Frequency Stop    09/27/24 1200  heparin 92104 units/250 mL (100 units/mL) in 0.45 % NaCl infusion  15.01 mL/hr         15.4 Units/kg/hr (Dosing Weight) IV Titrated --    09/27/24 1051  Pharmacy to Dose Heparin         -- XX Continuous PRN --                  Select Mechanical VTE Prophylaxis if Desired & Appropriate     The patient is high risk due to the following diagnoses/reasons: Hypoxia    Disposition: Continue current admission    Acosta Linn DO  [facility] Hospitalist  09/28/24  08:54 EDT

## 2024-09-28 NOTE — PLAN OF CARE
Problem: Adult Inpatient Plan of Care  Goal: Plan of Care Review  Outcome: Progressing   Goal Outcome Evaluation:  Plan of Care Reviewed With: patient      Patient resting in bed. Family at bedside. IV fluids and Heparin gtt infusing per order. IV patent. Reported pleuritic pain throughout shift with improvement noted with utilization of PRN medications. Plan of care ongoing.

## 2024-09-28 NOTE — PLAN OF CARE
Goal Outcome Evaluation:  Plan of Care Reviewed With: patient        Progress: no change  Outcome Evaluation: Patient resting in bed. Patient is alert and oriented. Heparin drip at 15.4 units/kg/hr. Patient had elevated temp of 102.6, PA notified, see order. Patient c/o of pain, PA notified, see orders. Will continue with plan of care.

## 2024-09-28 NOTE — PLAN OF CARE
Goal Outcome Evaluation:  Plan of Care Reviewed With: patient        Progress: no change  Outcome Evaluation: Patient admitted from ED. Patient is alert and oriented. No acute changes or complaints at this times. Patient has eleveated temp 102.4, notified PA. 2L NC. Will continue with plan of care.

## 2024-09-28 NOTE — PROGRESS NOTES
HEPARIN INFUSION  Don Tavarez is a  64 y.o. male receiving heparin infusion.     Therapy for (VTE/Cardiac):   VTE  Patient Dosing Weight: 97.5 kg  Initial Bolus (Y/N):   Y  Any Bolus (Y/N):   Y        Signs or Symptoms of Bleeding: N        VTE (PE/DVT)   Initial Bolus: 80 units/kg (Max 10,000 units)  Initial rate: 18 units/kg/hr (Max 1,500 units/hr)    Anti Xa Rebolus Infusion Hold time Change infusion Dose (Units/kg/hr) Next Anti Xa Level Due   < 0.11 50 Units/kg  (4000 Units Max) None Increase by  4 Units/kg/hr 6 hours   0.11 - 0.19 25 Units/kg  (2000 Units Max) None Increase by  3 Units/kg/hr 6 hours   0.2 - 0.29 0 None Increase by  2 Units/kg/hr 6 hours   0.3 - 0.7 0 None No Change 6 hours (after 2 consecutive levels in range check q24h @0700)   0.71 - 0.8 0 None Decrease by  1 Units/kg/hr 6 hours   0.81 - 0.9 0 None Decrease by  2 Units/kg/hr 6 hours   0.91 - 1 0 60 Minutes Decrease by  3 Units/kg/hr 6 hours   >1 0 Hold  After Anti Xa less than 0.7 decrease previous rate by  4 Units/kg/hr  Every 2 hours until Anti Xa is less than 0.7 then when infusion restarts in 6 hours     Recommend anti-Xa every 6 hours.          Date   Time   Anti-Xa Current Rate (Unit/kg/hr) Bolus   (Units) Rate Change   (Unit/kg/hr) New Rate (Unit/kg/hr) Next   Anti-Xa Comments  Pump Check Daily   9/27 1122 <0.1  7800  15.4 1800 D/w JOSESITO Donis   9/27 1817 0.70 15.4   15.4 0000 Therapeutic x1. No s/s of bleeding per nurse Jewels in ER.    9/27 2351 0.41 15.4   15.4 9/28 @0700 Therapeutic x2. Discussed with Elizabeth BELLAMY. No s/s of bleeding. Did receive a big bolus and Xa is trending down so will get next level 9/28@700     9/28 0850 0.14 15.4 2000 +3 18.4 1700 Bolus, rate adjusted, no s/s bleeding, d/w    Mae  RN                                                                                                                                                                                                Pharmacy will continue to follow  anti-Xa results and monitor for signs and symptoms of bleeding or thrombosis.        erum

## 2024-09-29 LAB
ANION GAP SERPL CALCULATED.3IONS-SCNC: 11 MMOL/L (ref 5–15)
BASOPHILS # BLD AUTO: 0.05 10*3/MM3 (ref 0–0.2)
BASOPHILS NFR BLD AUTO: 0.6 % (ref 0–1.5)
BUN SERPL-MCNC: 11 MG/DL (ref 8–23)
BUN/CREAT SERPL: 14.1 (ref 7–25)
CALCIUM SPEC-SCNC: 8.3 MG/DL (ref 8.6–10.5)
CHLORIDE SERPL-SCNC: 108 MMOL/L (ref 98–107)
CO2 SERPL-SCNC: 21 MMOL/L (ref 22–29)
CREAT SERPL-MCNC: 0.78 MG/DL (ref 0.76–1.27)
DEPRECATED RDW RBC AUTO: 41.1 FL (ref 37–54)
EGFRCR SERPLBLD CKD-EPI 2021: 99.6 ML/MIN/1.73
EOSINOPHIL # BLD AUTO: 0.34 10*3/MM3 (ref 0–0.4)
EOSINOPHIL NFR BLD AUTO: 3.9 % (ref 0.3–6.2)
ERYTHROCYTE [DISTWIDTH] IN BLOOD BY AUTOMATED COUNT: 11.9 % (ref 12.3–15.4)
GLUCOSE SERPL-MCNC: 91 MG/DL (ref 65–99)
HCT VFR BLD AUTO: 36.7 % (ref 37.5–51)
HGB BLD-MCNC: 11.7 G/DL (ref 13–17.7)
IMM GRANULOCYTES # BLD AUTO: 0.04 10*3/MM3 (ref 0–0.05)
IMM GRANULOCYTES NFR BLD AUTO: 0.5 % (ref 0–0.5)
LYMPHOCYTES # BLD AUTO: 2.29 10*3/MM3 (ref 0.7–3.1)
LYMPHOCYTES NFR BLD AUTO: 26 % (ref 19.6–45.3)
MAGNESIUM SERPL-MCNC: 2.1 MG/DL (ref 1.6–2.4)
MCH RBC QN AUTO: 30.6 PG (ref 26.6–33)
MCHC RBC AUTO-ENTMCNC: 31.9 G/DL (ref 31.5–35.7)
MCV RBC AUTO: 96.1 FL (ref 79–97)
MONOCYTES # BLD AUTO: 1.03 10*3/MM3 (ref 0.1–0.9)
MONOCYTES NFR BLD AUTO: 11.7 % (ref 5–12)
NEUTROPHILS NFR BLD AUTO: 5.06 10*3/MM3 (ref 1.7–7)
NEUTROPHILS NFR BLD AUTO: 57.3 % (ref 42.7–76)
NRBC BLD AUTO-RTO: 0 /100 WBC (ref 0–0.2)
PLATELET # BLD AUTO: 237 10*3/MM3 (ref 140–450)
PMV BLD AUTO: 9.1 FL (ref 6–12)
POTASSIUM SERPL-SCNC: 4 MMOL/L (ref 3.5–5.2)
RBC # BLD AUTO: 3.82 10*6/MM3 (ref 4.14–5.8)
SODIUM SERPL-SCNC: 140 MMOL/L (ref 136–145)
UFH PPP CHRO-ACNC: 0.38 IU/ML (ref 0.3–0.7)
WBC NRBC COR # BLD AUTO: 8.81 10*3/MM3 (ref 3.4–10.8)

## 2024-09-29 PROCEDURE — 25810000003 SODIUM CHLORIDE 0.9 % SOLUTION: Performed by: FAMILY MEDICINE

## 2024-09-29 PROCEDURE — 99232 SBSQ HOSP IP/OBS MODERATE 35: CPT | Performed by: FAMILY MEDICINE

## 2024-09-29 PROCEDURE — 85520 HEPARIN ASSAY: CPT | Performed by: FAMILY MEDICINE

## 2024-09-29 PROCEDURE — 83735 ASSAY OF MAGNESIUM: CPT | Performed by: FAMILY MEDICINE

## 2024-09-29 PROCEDURE — 85302 CLOT INHIBIT PROT C ANTIGEN: CPT | Performed by: FAMILY MEDICINE

## 2024-09-29 PROCEDURE — 25010000002 HEPARIN (PORCINE) 25000-0.45 UT/250ML-% SOLUTION: Performed by: FAMILY MEDICINE

## 2024-09-29 PROCEDURE — 85025 COMPLETE CBC W/AUTO DIFF WBC: CPT | Performed by: FAMILY MEDICINE

## 2024-09-29 PROCEDURE — 80048 BASIC METABOLIC PNL TOTAL CA: CPT | Performed by: FAMILY MEDICINE

## 2024-09-29 RX ADMIN — SODIUM CHLORIDE 100 ML/HR: 9 INJECTION, SOLUTION INTRAVENOUS at 14:52

## 2024-09-29 RX ADMIN — HYDROCODONE BITARTRATE AND ACETAMINOPHEN 1 TABLET: 7.5; 325 TABLET ORAL at 17:09

## 2024-09-29 RX ADMIN — Medication 2000 UNITS: at 08:00

## 2024-09-29 RX ADMIN — Medication 10 ML: at 21:38

## 2024-09-29 RX ADMIN — LISINOPRIL 20 MG: 10 TABLET ORAL at 08:00

## 2024-09-29 RX ADMIN — Medication 10 ML: at 08:00

## 2024-09-29 RX ADMIN — HEPARIN SODIUM 18.4 UNITS/KG/HR: 10000 INJECTION, SOLUTION INTRAVENOUS at 09:58

## 2024-09-29 RX ADMIN — SODIUM CHLORIDE 100 ML/HR: 9 INJECTION, SOLUTION INTRAVENOUS at 03:39

## 2024-09-29 RX ADMIN — ACETAMINOPHEN 650 MG: 325 TABLET ORAL at 08:00

## 2024-09-29 NOTE — PLAN OF CARE
Goal Outcome Evaluation:     Patient resting in bed. Pt complained of pain, PRN medication provided, see MAR. Hep gtt infusing per orders. No visible indicators of acute distress noted. Plan of care ongoing.

## 2024-09-29 NOTE — PROGRESS NOTES
Psychiatric HOSPITALIST PROGRESS NOTE     Patient Identification:  Name:  Don Tavarez  Age:  64 y.o.  Sex:  male  :  1960  MRN:  [id number]  Visit Number:  [id number]  ROOM: [address] [place] [address]     Primary Care Provider:  Jeanna Levi APRN     Date of Admission: 2024    Length of stay in inpatient status:  2    Subjective     Chief Compliant:    Chief Complaint   Patient presents with    Chest Pain     History of Presenting Illness:    Patient says that he is breathing much better overall.  Patient's pain continues to improve.  He is very tired today as he says he did not sleep much last night.      Current Hospital Meds:  cholecalciferol, 2,000 Units, Oral, Daily  lisinopril, 20 mg, Oral, Daily  sodium chloride, 10 mL, Intravenous, Q12H    heparin, 18.4 Units/kg/hr (Dosing Weight), Last Rate: 18.4 Units/kg/hr (24 0958)  Pharmacy to Dose Heparin,   sodium chloride, 100 mL/hr, Last Rate: 100 mL/hr (24 0800)      Current Antimicrobial Therapy:  Anti-Infectives (From admission, onward)      None          Current Diuretic Therapy:  Diuretics (From admission, onward)      None          ----------------------------------------------------------------------------------------------------------------------  Vital Signs:  Temp:  [98.2 °F (36.8 °C)-98.7 °F (37.1 °C)] 98.5 °F (36.9 °C)  Heart Rate:  [79-87] 79  Resp:  [18-20] 20  BP: (108-162)/(61-83) 148/75  SpO2:  [95 %-97 %] 95 %  on  Flow (L/min):  [2] 2;   Device (Oxygen Therapy): nasal cannula  Body mass index is 34.93 kg/m².    Wt Readings from Last 3 Encounters:   24 104 kg (229 lb 11.2 oz)   16 90.7 kg (200 lb)     Intake & Output (last 3 days)          07    P.O.   240     I.V. (mL/kg)   1203.2 (12.3)     Total Intake(mL/kg)   1443.2 (14.8)     Urine (mL/kg/hr)   500     Total Output   500     Net   +943.2                   Diet:  Diabetic; Consistent Carbohydrate; Fluid Consistency: Thin (IDDSI 0)  ----------------------------------------------------------------------------------------------------------------------  Physical Exam    Constitutional:  Well-developed and well-nourished.  No acute distress.      HENT:  Head:  Normocephalic and atraumatic.  Mouth:  Moist mucous membranes.    Eyes:  Conjunctivae and EOM are normal. No scleral icterus.    Neck:  Neck supple.  No JVD present.    Cardiovascular:  Normal rate, regular rhythm and normal heart sounds with no murmur.  Pulmonary/Chest:  No respiratory distress, no wheezes, no crackles, with normal breath sounds and good air movement.  Abdominal:  Soft. No distension and no tenderness.   Musculoskeletal:  No tenderness, and no deformity.  No red or swollen joints anywhere.    Neurological:  Alert and oriented to person, place, and time.  No cranial nerve deficit.    Skin:  Skin is warm and dry. No rash noted. No pallor.   Peripheral vascular:  No clubbing, no cyanosis, no edema.  Psychiatric: Appropriate mood and affect  :    ----------------------------------------------------------------------------------------------------------------------  Tele:    ----------------------------------------------------------------------------------------------------------------------  LABS:    CBC and coagulation:  Results from last 7 days   Lab Units 09/29/24  0811 09/27/24  2351 09/27/24  0918   WBC 10*3/mm3 8.81 13.81* 13.19*   HEMOGLOBIN g/dL 11.7* 13.1 14.0   HEMATOCRIT % 36.7* 38.7 41.4   MCV fL 96.1 93.3 93.9   MCHC g/dL 31.9 33.9 33.8   PLATELETS 10*3/mm3 237 224 241   INR   --   --  0.94   D DIMER QUANT MCGFEU/mL  --   --  0.86*     Acid/base balance:  Results from last 7 days   Lab Units 09/27/24  1102   PH, ARTERIAL pH units 7.424   PO2 ART mm Hg 50.3*   PCO2, ARTERIAL mm Hg 36.4   HCO3 ART mmol/L 23.8     Renal and electrolytes:  Results from last 7 days   Lab Units 09/29/24  0811  "09/27/24  2351 09/27/24  0918   SODIUM mmol/L 140 135* 137   POTASSIUM mmol/L 4.0 4.2 4.2   MAGNESIUM mg/dL 2.1  --   --    CHLORIDE mmol/L 108* 102 104   CO2 mmol/L 21.0* 21.7* 20.9*   BUN mg/dL 11 16 15   CREATININE mg/dL 0.78 1.00 1.07   CALCIUM mg/dL 8.3* 8.7 9.3   GLUCOSE mg/dL 91 110* 111*     Estimated Creatinine Clearance: 111.8 mL/min (by C-G formula based on SCr of 0.78 mg/dL).    Liver and pancreatic function:  Results from last 7 days   Lab Units 09/27/24  0918   ALBUMIN g/dL 4.0   BILIRUBIN mg/dL 1.1   ALK PHOS U/L 92   AST (SGOT) U/L 21   ALT (SGPT) U/L 30     Endocrine function:  No results found for: \"HGBA1C\"  Point of care bedside glucose levels:      Glucose levels from the [facility]:  Results from last 7 days   Lab Units 09/29/24  0811 09/27/24  2351 09/27/24 0918   GLUCOSE mg/dL 91 110* 111*     No results found for: \"TSH\", \"FREET4\"  Cardiac:  Results from last 7 days   Lab Units 09/27/24  1122 09/27/24 0918   HSTROP T ng/L 10 10   PROBNP pg/mL  --  67.6       Cultures:  No results found for: \"COLORU\", \"CLARITYU\", \"SPECGRAV\", \"PHUR\", \"PROTEINUR\", \"GLUCOSEU\", \"KETONESU\", \"BLOODU\", \"NITRITEU\", \"LEUKOCYTESUR\", \"BILIRUBINUR\", \"UROBILINOGEN\", \"RBCUA\", \"WBCUA\", \"BACTERIA\", \"UACOMMENT\"  Microbiology Results (last 10 days)       ** No results found for the last 240 hours. **            No results found for: \"PREGTESTUR\", \"PREGSERUM\", \"HCG\", \"HCGQUANT\"  Pain Management Panel           No data to display                I have personally looked at the labs and they are summarized above.  ----------------------------------------------------------------------------------------------------------------------  Detailed radiology reports for the last 24 hours:    Imaging Results (Last 24 Hours)       ** No results found for the last 24 hours. **          I have personally looked at the radiology images and I have read the available final and preliminary reports.    Assessment & Plan    Large acute right-sided " pulmonary embolism  Acute respiratory failure with hypoxia  The workup has been somewhat benign thus far.  The patient's venous Dopplers have been negative and he is 2D echo was negative as well  Labs for clotting disorder.  Still pending  We will continue the patient on a heparin drip  Will consult  to determine whether the patient's insurance will cover Eliquis  Will get more aggressive with the patient's pain medication  Bilateral lower extremity venous Dopplers    Hypertension  I will review and reconcile patient's home medication    VTE Prophylaxis:   Active VTE Prophylaxis:  Pharmacologic:        Start     Dose Route Frequency Stop    09/27/24 1200  heparin 99241 units/250 mL (100 units/mL) in 0.45 % NaCl infusion  17.94 mL/hr         18.4 Units/kg/hr (Dosing Weight) IV Titrated --    09/27/24 1051  Pharmacy to Dose Heparin         -- XX Continuous PRN --                  Select Mechanical VTE Prophylaxis if Desired & Appropriate     The patient is high risk due to the following diagnoses/reasons: Hypoxia    Disposition: Continue current admission    Acosta Linn DO  [facility]  09/29/24  14:13 EDT

## 2024-09-29 NOTE — PROGRESS NOTES
HEPARIN INFUSION  Don Tavarez is a  64 y.o. male receiving heparin infusion.     Therapy for (VTE/Cardiac):   VTE  Patient Dosing Weight: 97.5 kg  Initial Bolus (Y/N):   Y  Any Bolus (Y/N):   Y        Signs or Symptoms of Bleeding: N        VTE (PE/DVT)   Initial Bolus: 80 units/kg (Max 10,000 units)  Initial rate: 18 units/kg/hr (Max 1,500 units/hr)    Anti Xa Rebolus Infusion Hold time Change infusion Dose (Units/kg/hr) Next Anti Xa Level Due   < 0.11 50 Units/kg  (4000 Units Max) None Increase by  4 Units/kg/hr 6 hours   0.11 - 0.19 25 Units/kg  (2000 Units Max) None Increase by  3 Units/kg/hr 6 hours   0.2 - 0.29 0 None Increase by  2 Units/kg/hr 6 hours   0.3 - 0.7 0 None No Change 6 hours (after 2 consecutive levels in range check q24h @0700)   0.71 - 0.8 0 None Decrease by  1 Units/kg/hr 6 hours   0.81 - 0.9 0 None Decrease by  2 Units/kg/hr 6 hours   0.91 - 1 0 60 Minutes Decrease by  3 Units/kg/hr 6 hours   >1 0 Hold  After Anti Xa less than 0.7 decrease previous rate by  4 Units/kg/hr  Every 2 hours until Anti Xa is less than 0.7 then when infusion restarts in 6 hours     Recommend anti-Xa every 6 hours.          Date   Time   Anti-Xa Current Rate (Unit/kg/hr) Bolus   (Units) Rate Change   (Unit/kg/hr) New Rate (Unit/kg/hr) Next   Anti-Xa Comments  Pump Check Daily   9/27 1122 <0.1  7800  15.4 1800 D/w JOSESITO Donis   9/27 1817 0.70 15.4   15.4 0000 Therapeutic x1. No s/s of bleeding per nurse Jewels in ER.    9/27 2351 0.41 15.4   15.4 9/28 @0700 Therapeutic x2. Discussed with Elizabeth BELLAMY. No s/s of bleeding. Did receive a big bolus and Xa is trending down so will get next level 9/28@700     9/28 0850 0.14 15.4 2000 +3 18.4 1700 Bolus, rate adjusted, no s/s bleeding, d/w    Mae BELLAMY   9/28 1946 0.36 18.4   18.4 0200 No s/s of bleeding per nurse Hilliard   9/29 0325 0.38 18.4 - - 18.4 0700 on 9/30 Therapeutic x 2. No  s/s of bleeding per JOSESITO Gardner.  Will transition to Qa monitoring.                                                                                                                                                                             Pharmacy will continue to follow anti-Xa results and monitor for signs and symptoms of bleeding or thrombosis.    Cortez Guerrero PharmJETT  03:47 EDT.  09/29/24

## 2024-09-29 NOTE — PLAN OF CARE
Problem: Adult Inpatient Plan of Care  Goal: Plan of Care Review  Outcome: Progressing   Goal Outcome Evaluation:  Plan of Care Reviewed With: patient      Patient resting in bed. Family at bedside. IV fluids and Heparin infusing per order. IV patent. Plan of care ongoing.

## 2024-09-30 ENCOUNTER — TELEPHONE (OUTPATIENT)
Dept: TELEMETRY | Facility: HOSPITAL | Age: 64
End: 2024-09-30
Payer: COMMERCIAL

## 2024-09-30 VITALS
OXYGEN SATURATION: 94 % | SYSTOLIC BLOOD PRESSURE: 137 MMHG | DIASTOLIC BLOOD PRESSURE: 81 MMHG | WEIGHT: 228.9 LBS | HEIGHT: 68 IN | TEMPERATURE: 98.2 F | RESPIRATION RATE: 20 BRPM | BODY MASS INDEX: 34.69 KG/M2 | HEART RATE: 83 BPM

## 2024-09-30 LAB
ANION GAP SERPL CALCULATED.3IONS-SCNC: 10.6 MMOL/L (ref 5–15)
BASOPHILS # BLD AUTO: 0.08 10*3/MM3 (ref 0–0.2)
BASOPHILS NFR BLD AUTO: 0.9 % (ref 0–1.5)
BUN SERPL-MCNC: 9 MG/DL (ref 8–23)
BUN/CREAT SERPL: 11.1 (ref 7–25)
CALCIUM SPEC-SCNC: 8.5 MG/DL (ref 8.6–10.5)
CARDIOLIPIN IGG SER IA-ACNC: <9 GPL U/ML (ref 0–14)
CARDIOLIPIN IGM SER IA-ACNC: <9 MPL U/ML (ref 0–12)
CHLORIDE SERPL-SCNC: 108 MMOL/L (ref 98–107)
CO2 SERPL-SCNC: 21.4 MMOL/L (ref 22–29)
CREAT SERPL-MCNC: 0.81 MG/DL (ref 0.76–1.27)
DEPRECATED RDW RBC AUTO: 40 FL (ref 37–54)
EGFRCR SERPLBLD CKD-EPI 2021: 98.5 ML/MIN/1.73
EOSINOPHIL # BLD AUTO: 0.36 10*3/MM3 (ref 0–0.4)
EOSINOPHIL NFR BLD AUTO: 4.2 % (ref 0.3–6.2)
ERYTHROCYTE [DISTWIDTH] IN BLOOD BY AUTOMATED COUNT: 11.8 % (ref 12.3–15.4)
FACTOR II, DNA ANALYSIS: NORMAL
GLUCOSE SERPL-MCNC: 92 MG/DL (ref 65–99)
HCT VFR BLD AUTO: 38.4 % (ref 37.5–51)
HGB BLD-MCNC: 12.9 G/DL (ref 13–17.7)
IMM GRANULOCYTES # BLD AUTO: 0.04 10*3/MM3 (ref 0–0.05)
IMM GRANULOCYTES NFR BLD AUTO: 0.5 % (ref 0–0.5)
LYMPHOCYTES # BLD AUTO: 2.86 10*3/MM3 (ref 0.7–3.1)
LYMPHOCYTES NFR BLD AUTO: 33.2 % (ref 19.6–45.3)
MCH RBC QN AUTO: 31.2 PG (ref 26.6–33)
MCHC RBC AUTO-ENTMCNC: 33.6 G/DL (ref 31.5–35.7)
MCV RBC AUTO: 92.8 FL (ref 79–97)
MONOCYTES # BLD AUTO: 0.93 10*3/MM3 (ref 0.1–0.9)
MONOCYTES NFR BLD AUTO: 10.8 % (ref 5–12)
NEUTROPHILS NFR BLD AUTO: 4.34 10*3/MM3 (ref 1.7–7)
NEUTROPHILS NFR BLD AUTO: 50.4 % (ref 42.7–76)
NRBC BLD AUTO-RTO: 0 /100 WBC (ref 0–0.2)
PLATELET # BLD AUTO: 277 10*3/MM3 (ref 140–450)
PMV BLD AUTO: 8.8 FL (ref 6–12)
POTASSIUM SERPL-SCNC: 3.9 MMOL/L (ref 3.5–5.2)
RBC # BLD AUTO: 4.14 10*6/MM3 (ref 4.14–5.8)
SODIUM SERPL-SCNC: 140 MMOL/L (ref 136–145)
UFH PPP CHRO-ACNC: 0.38 IU/ML (ref 0.3–0.7)
WBC NRBC COR # BLD AUTO: 8.61 10*3/MM3 (ref 3.4–10.8)

## 2024-09-30 PROCEDURE — 99239 HOSP IP/OBS DSCHRG MGMT >30: CPT

## 2024-09-30 PROCEDURE — 25010000002 HEPARIN (PORCINE) 25000-0.45 UT/250ML-% SOLUTION: Performed by: FAMILY MEDICINE

## 2024-09-30 PROCEDURE — 80048 BASIC METABOLIC PNL TOTAL CA: CPT | Performed by: FAMILY MEDICINE

## 2024-09-30 PROCEDURE — 85025 COMPLETE CBC W/AUTO DIFF WBC: CPT | Performed by: FAMILY MEDICINE

## 2024-09-30 PROCEDURE — 85520 HEPARIN ASSAY: CPT | Performed by: FAMILY MEDICINE

## 2024-09-30 PROCEDURE — 25810000003 SODIUM CHLORIDE 0.9 % SOLUTION: Performed by: FAMILY MEDICINE

## 2024-09-30 RX ADMIN — SODIUM CHLORIDE 100 ML/HR: 9 INJECTION, SOLUTION INTRAVENOUS at 10:38

## 2024-09-30 RX ADMIN — SODIUM CHLORIDE 100 ML/HR: 9 INJECTION, SOLUTION INTRAVENOUS at 00:55

## 2024-09-30 RX ADMIN — HEPARIN SODIUM 18.4 UNITS/KG/HR: 10000 INJECTION, SOLUTION INTRAVENOUS at 00:46

## 2024-09-30 RX ADMIN — Medication 2000 UNITS: at 08:41

## 2024-09-30 RX ADMIN — LISINOPRIL 20 MG: 10 TABLET ORAL at 08:41

## 2024-09-30 RX ADMIN — APIXABAN 10 MG: 5 TABLET, FILM COATED ORAL at 14:37

## 2024-09-30 RX ADMIN — Medication 10 ML: at 08:41

## 2024-09-30 NOTE — PHARMACY PATIENT ASSISTANCE
Pharmacy was consulted for cost/coverage for newer DOACs.  According to his insurance, he will have a co-insurance of $113.50 for Eliquis 30 day supply and a co-insurance of $108.76 for Xarelto 30 day supply.  Patient has commercial insurance, therefore he can use a copay card for either medication, which should bring cost down to around $10-15.    Thank you  Juli Titus, Pharmacy Intern  09/30/24  14:34 EDT

## 2024-09-30 NOTE — PLAN OF CARE
Problem: Adult Inpatient Plan of Care  Goal: Plan of Care Review  Outcome: Adequate for Care Transition  Outcome: Progressing   Goal Outcome Evaluation:  Plan of Care Reviewed With: patient, family      Patient to be discharged home. IV and telemetry removed. Discharge education provided. Verbalizes understanding. Patient to be transported home via family vehicle.

## 2024-09-30 NOTE — PLAN OF CARE
Goal Outcome Evaluation:     Patient resting in bed. No visible indicators of acute distress noted. Hep gtt infusing per orders. Plan of care ongoing.

## 2024-09-30 NOTE — PROGRESS NOTES
Patient Identification:  Name:  Don Tavarez  Age:  64 y.o.  Sex:  male  :  1960  MRN:  [id number]  Visit Number:  [id number]  Primary Care Provider:  Jeanna Levi APRN    Length of stay:  3    Subjective/Interval History/Consultants/Procedures     Chief Complaint:   Chief Complaint   Patient presents with    Chest Pain       Subjective/Interval History:    64 y.o. male who was admitted on 2024 with acute PE    PMH is significant for hypertension  For complete admission information, please see history and physical.     Consultations:  Pharmacy    Procedures/Scans:  CXR  CT PE protocol  Gallbladder US  Bilateral lower extremity venous Doppler US  TTE    Today, the patient was seen and examined with family member present at bedside.  He reports he is feeling clinically improved.  Work of breathing is improved, less pain on breathing.  Remains on supplemental O2 at the time of exam.  No new complaints noted today.  Reports eating and drinking well.    Room location at the time of evaluation was [facility].    ----------------------------------------------------------------------------------------------------------------------  [facility] Meds:  cholecalciferol, 2,000 Units, Oral, Daily  lisinopril, 20 mg, Oral, Daily  sodium chloride, 10 mL, Intravenous, Q12H      heparin, 18.4 Units/kg/hr (Dosing Weight), Last Rate: 18.4 Units/kg/hr (24 1025)  Pharmacy Consult,   Pharmacy to Dose Heparin,   sodium chloride, 100 mL/hr, Last Rate: 100 mL/hr (24 1038)      ----------------------------------------------------------------------------------------------------------------------      Objective     Vital Signs:  Temp:  [97.4 °F (36.3 °C)-98.7 °F (37.1 °C)] 98.7 °F (37.1 °C)  Heart Rate:  [77-83] 79  Resp:  [18-20] 20  BP: (138-180)/(76-88) 142/81      24  2048 24  0500 24  0534   Weight: 105 kg (230 lb 11.2 oz) 104 kg (229 lb 11.2 oz) 104 kg (228 lb 14.4 oz)     Body mass index  is 34.8 kg/m².    Intake/Output Summary (Last 24 hours) at 9/30/2024 1334  Last data filed at 9/30/2024 0900  Gross per 24 hour   Intake 3339.93 ml   Output --   Net 3339.93 ml     I/O this shift:  In: 480 [P.O.:480]  Out: -   Diet: Diabetic; Consistent Carbohydrate; Fluid Consistency: Thin (IDDSI 0)  ----------------------------------------------------------------------------------------------------------------------    Physical Exam  Vitals and nursing note reviewed.   Constitutional:       General: He is not in acute distress.     Appearance: He is obese.   HENT:      Head: Normocephalic and atraumatic.   Eyes:      Extraocular Movements: Extraocular movements intact.   Cardiovascular:      Rate and Rhythm: Normal rate and regular rhythm.   Pulmonary:      Effort: Pulmonary effort is normal. No respiratory distress.   Abdominal:      Palpations: Abdomen is soft.      Tenderness: There is no abdominal tenderness.   Musculoskeletal:      Right lower leg: No edema.      Left lower leg: No edema.   Skin:     General: Skin is warm and dry.   Neurological:      Mental Status: He is alert. Mental status is at baseline.   Psychiatric:         Mood and Affect: Mood normal.         Behavior: Behavior normal.                ----------------------------------------------------------------------------------------------------------------------  Tele:      ----------------------------------------------------------------------------------------------------------------------  Results from last 7 days   Lab Units 09/27/24  1122 09/27/24 0918   HSTROP T ng/L 10 10   PROBNP pg/mL  --  67.6     Results from last 7 days   Lab Units 09/30/24  0707 09/29/24  0811 09/27/24  2351 09/27/24 0918   WBC 10*3/mm3 8.61 8.81 13.81* 13.19*   HEMOGLOBIN g/dL 12.9* 11.7* 13.1 14.0   HEMATOCRIT % 38.4 36.7* 38.7 41.4   MCV fL 92.8 96.1 93.3 93.9   MCHC g/dL 33.6 31.9 33.9 33.8   PLATELETS 10*3/mm3 277 237 224 241   INR   --   --   --  0.94  "    Results from last 7 days   Lab Units 09/27/24  1102   PH, ARTERIAL pH units 7.424   PO2 ART mm Hg 50.3*   PCO2, ARTERIAL mm Hg 36.4   HCO3 ART mmol/L 23.8     Results from last 7 days   Lab Units 09/30/24  0707 09/29/24  0811 09/27/24  2351 09/27/24  0918   SODIUM mmol/L 140 140 135* 137   POTASSIUM mmol/L 3.9 4.0 4.2 4.2   MAGNESIUM mg/dL  --  2.1  --   --    CHLORIDE mmol/L 108* 108* 102 104   CO2 mmol/L 21.4* 21.0* 21.7* 20.9*   BUN mg/dL 9 11 16 15   CREATININE mg/dL 0.81 0.78 1.00 1.07   CALCIUM mg/dL 8.5* 8.3* 8.7 9.3   GLUCOSE mg/dL 92 91 110* 111*   ALBUMIN g/dL  --   --   --  4.0   BILIRUBIN mg/dL  --   --   --  1.1   ALK PHOS U/L  --   --   --  92   AST (SGOT) U/L  --   --   --  21   ALT (SGPT) U/L  --   --   --  30   Estimated Creatinine Clearance: 107.6 mL/min (by C-G formula based on SCr of 0.81 mg/dL).  No results found for: \"AMMONIA\"      No results found for: \"BLOODCX\"  No results found for: \"URINECX\"  No results found for: \"WOUNDCX\"  No results found for: \"STOOLCX\"  ----------------------------------------------------------------------------------------------------------------------  Imaging Results (Last 24 Hours)       ** No results found for the last 24 hours. **          ----------------------------------------------------------------------------------------------------------------------   I have reviewed the above laboratory values for 09/30/24    Assessment/Plan     Active Hospital Problems    Diagnosis  POA    **Pulmonary embolism [I26.99]  Yes         ASSESSMENT/PLAN:    Large acute right-sided pulmonary embolism  Acute respiratory failure with hypoxia, 2/2 above  He was started on heparin infusion on admission.  Pharmacy is dosing, assistance appreciated.  Monitor per protocol.  Will also consult pharmacy for DOAC pricing, appreciated.  Hypercoagulability workup sent and pending.  If stable for discharge prior to results may follow-up on these as an outpatient.  Patient reports pain " improving, using pain medication less frequently per MAR.  Labs and VSS stable.  Remains on 2 L per nasal cannula with O2 sats mid 90s.  Will wean as able.    Hypertension  Lisinopril continued  -----------  -DVT prophylaxis: Currently on heparin drip  -Disposition plans/anticipated needs: Pending further clinical course.  Potential discharge within 24 to 48 hours if remaining clinically stable.        The patient is high risk due to the following diagnoses/reasons: Acute pulmonary embolism        Jay Jay Goodman PA-C  09/30/24  13:34 EDT

## 2024-09-30 NOTE — PROGRESS NOTES
HEPARIN INFUSION  Don Tavarez is a  64 y.o. male receiving heparin infusion.     Therapy for (VTE/Cardiac):   VTE  Patient Dosing Weight: 97.5 kg  Initial Bolus (Y/N):   Y  Any Bolus (Y/N):   Y        Signs or Symptoms of Bleeding: N        VTE (PE/DVT)   Initial Bolus: 80 units/kg (Max 10,000 units)  Initial rate: 18 units/kg/hr (Max 1,500 units/hr)    Anti Xa Rebolus Infusion Hold time Change infusion Dose (Units/kg/hr) Next Anti Xa Level Due   < 0.11 50 Units/kg  (4000 Units Max) None Increase by  4 Units/kg/hr 6 hours   0.11 - 0.19 25 Units/kg  (2000 Units Max) None Increase by  3 Units/kg/hr 6 hours   0.2 - 0.29 0 None Increase by  2 Units/kg/hr 6 hours   0.3 - 0.7 0 None No Change 6 hours (after 2 consecutive levels in range check q24h @0700)   0.71 - 0.8 0 None Decrease by  1 Units/kg/hr 6 hours   0.81 - 0.9 0 None Decrease by  2 Units/kg/hr 6 hours   0.91 - 1 0 60 Minutes Decrease by  3 Units/kg/hr 6 hours   >1 0 Hold  After Anti Xa less than 0.7 decrease previous rate by  4 Units/kg/hr  Every 2 hours until Anti Xa is less than 0.7 then when infusion restarts in 6 hours     Recommend anti-Xa every 6 hours.          Date   Time   Anti-Xa Current Rate (Unit/kg/hr) Bolus   (Units) Rate Change   (Unit/kg/hr) New Rate (Unit/kg/hr) Next   Anti-Xa Comments  Pump Check Daily   9/27 1122 <0.1  7800  15.4 1800 D/w JOSESITO Donis   9/27 1817 0.70 15.4   15.4 0000 Therapeutic x1. No s/s of bleeding per nurse Jewels in ER.    9/27 2351 0.41 15.4   15.4 9/28 @0700 Therapeutic x2. Discussed with Elizabeth BELLAMY. No s/s of bleeding. Did receive a big bolus and Xa is trending down so will get next level 9/28@700     9/28 0850 0.14 15.4 2000 +3 18.4 1700 Bolus, rate adjusted, no s/s bleeding, d/w    Mae BELLAMY   9/28 1946 0.36 18.4   18.4 0200 No s/s of bleeding per nurse Hilliard   9/29 0325 0.38 18.4 - - 18.4 0700 on 9/30 Therapeutic x 2. No  s/s of bleeding per JOSESITO Gardner.  Will transition to Qam monitoring.     9/30 0707 0.38  18.4   18.4 10/1  @0700 Discussed with Mae BELLAMY. No s/s of bleeding                                                                                                                                                                Pharmacy will continue to follow anti-Xa results and monitor for signs and symptoms of bleeding or thrombosis.    Nereida TorresD

## 2024-09-30 NOTE — DISCHARGE SUMMARY
[facility] HOSPITALIST DISCHARGE SUMMARY    Patient Identification:  Name:  Don Tavarez  Age:  64 y.o.  Sex:  male  :  1960  MRN:  [id number]  Visit Number:  [id number]    Date of Admission: 2024  Date of Discharge:  24     PCP: Jeanna Levi APRN    Discharging Provider: Hiren Goodman PA-C / Dr. Camacho    Discharge Diagnoses     Discharge Diagnoses:  Acute, unprovoked, right-sided pulmonary embolism  Acute hypoxic respiratory failure, due to above and resolved    Secondary Diagnoses:  Hypertension    Needs on follow up:  PCP 1 week, hematology 3 months  Consults/Procedures     Consults:   Consults       No orders found from 2024 to 2024.            Procedures/Scans Performed:  CXR  CT PE protocol  Gallbladder US  Bilateral lower extremity venous Doppler US  TTE       History of Presenting Illness     Chief Complaint   Patient presents with    Chest Pain       Patient is a 64 y.o. male who presented to [facility] complaining of chest pain.  Please see the admitting history and physical for further details.      Hospital Course     Patient was admitted to [facility] on 2024 following presentation to [facility] ED for further evaluation of chest pain.  Patient reported onset of sharp, stabbing right-sided chest pain about 24 hours prior to arrival.  Pain was worse with breathing or coughing.  No cough or sputum production noted.  He denied recent illness-reported was exposed to COVID-19 about 2 weeks prior to this but never developed any symptoms.  No recent prolonged immobilization or surgeries.  He is a non-smoker.  No history of DVT or PE.  He was tachycardic in the ED, ABG showed pO2 50.3 on room air.  Troponin was negative x 2 but D-dimer was elevated at 0.86.  CT PE protocol showed large pulmonary embolism in the right pulmonary artery.  EKG showed right bundle branch block.  He was started on a heparin drip and admitted for further workup and  management.    As needed pain regimen was continued on admission.  TTE was obtained to further evaluate-noted normal LVEF, mild concentric left ventricular hypertrophy, grade 1 diastolic dysfunction.  We also obtained bilateral lower extremity venous Doppler US which was negative for DVT.  Hypercoagulability workup was sent and remains pending at the time of this documentation.  With treatment regimen outlined above patient did improve day over day and was weaned to room air on the date of this documentation without difficulty.  Given no further inpatient workup planned and patient remaining clinically stable Case was discussed with attending physician and agree he is stable for discharge home on this date.  We will start Eliquis at discharge and recommend continuing x 6 months given suspected unprovoked nature of PE.  We will have the patient follow-up with PCP within 1 week and refer for hematology consultation in about 3 months.  This discharge and follow-up plan was discussed with the patient and family member at bedside on the date of discharge and expressed agreement understanding.    Discharge Vitals/Physical Examination     Vital Signs:  Temp:  [97.4 °F (36.3 °C)-98.7 °F (37.1 °C)] 98.7 °F (37.1 °C)  Heart Rate:  [77-83] 79  Resp:  [18-20] 20  BP: (138-180)/(76-88) 142/81  Mean Arterial Pressure (Non-Invasive) for the past 24 hrs (Last 3 readings):   Noninvasive MAP (mmHg)   09/30/24 1121 100   09/30/24 0432 98   09/29/24 2351 97     SpO2 Percentage    09/30/24 0432 09/30/24 0730 09/30/24 1121   SpO2: 96% 95% 95%     SpO2:  [95 %-96 %] 95 %  on  Flow (L/min):  [2] 2;   Device (Oxygen Therapy): nasal cannula    Body mass index is 34.8 kg/m².  Wt Readings from Last 3 Encounters:   09/30/24 104 kg (228 lb 14.4 oz)   04/21/16 90.7 kg (200 lb)         Physical Exam:  Physical Exam  Vitals and nursing note reviewed.   Constitutional:       General: He is not in acute distress.  HENT:      Head: Normocephalic and  atraumatic.   Eyes:      Extraocular Movements: Extraocular movements intact.   Cardiovascular:      Rate and Rhythm: Normal rate.   Pulmonary:      Effort: Pulmonary effort is normal. No respiratory distress.   Abdominal:      Palpations: Abdomen is soft.   Musculoskeletal:      Right lower leg: No edema.      Left lower leg: No edema.   Skin:     General: Skin is warm and dry.   Neurological:      Mental Status: He is alert. Mental status is at baseline.   Psychiatric:         Mood and Affect: Mood normal.         Behavior: Behavior normal.         Pertinent Laboratory/Radiology Results     Pertinent Laboratory Results:  Results from last 7 days   Lab Units 09/27/24  1122 09/27/24  0918   HSTROP T ng/L 10 10     Results from last 7 days   Lab Units 09/27/24  0918   PROBNP pg/mL 67.6       Results from last 7 days   Lab Units 09/27/24  1102   PH, ARTERIAL pH units 7.424   PO2 ART mm Hg 50.3*   PCO2, ARTERIAL mm Hg 36.4   HCO3 ART mmol/L 23.8     Results from last 7 days   Lab Units 09/30/24  0707 09/29/24  0811 09/27/24 2351 09/27/24 0918   WBC 10*3/mm3 8.61 8.81 13.81* 13.19*   HEMOGLOBIN g/dL 12.9* 11.7* 13.1 14.0   HEMATOCRIT % 38.4 36.7* 38.7 41.4   MCV fL 92.8 96.1 93.3 93.9   MCHC g/dL 33.6 31.9 33.9 33.8   PLATELETS 10*3/mm3 277 237 224 241   INR   --   --   --  0.94     Results from last 7 days   Lab Units 09/30/24  0707 09/29/24  0811 09/27/24 2351 09/27/24 0918   SODIUM mmol/L 140 140 135* 137   POTASSIUM mmol/L 3.9 4.0 4.2 4.2   MAGNESIUM mg/dL  --  2.1  --   --    CHLORIDE mmol/L 108* 108* 102 104   CO2 mmol/L 21.4* 21.0* 21.7* 20.9*   BUN mg/dL 9 11 16 15   CREATININE mg/dL 0.81 0.78 1.00 1.07   CALCIUM mg/dL 8.5* 8.3* 8.7 9.3   GLUCOSE mg/dL 92 91 110* 111*   ALBUMIN g/dL  --   --   --  4.0   BILIRUBIN mg/dL  --   --   --  1.1   ALK PHOS U/L  --   --   --  92   AST (SGOT) U/L  --   --   --  21   ALT (SGPT) U/L  --   --   --  30   Estimated Creatinine Clearance: 107.6 mL/min (by C-G formula based  "on SCr of 0.81 mg/dL).  No results found for: \"AMMONIA\"    No results found for: \"HGBA1C\", \"POCGLU\"  No results found for: \"HGBA1C\"  No results found for: \"TSH\", \"FREET4\"    No results found for: \"BLOODCX\"  No results found for: \"URINECX\"  No results found for: \"WOUNDCX\"  No results found for: \"STOOLCX\"  No results found for: \"RESPCX\"  Pain Management Panel           No data to display                Pertinent Radiology Results:  Imaging Results (All)       Procedure Component Value Units Date/Time    US Venous Doppler Lower Extremity Bilateral (duplex) [[id number]] Collected: 09/28/24 1120     Updated: 09/28/24 1122    Narrative:      US VENOUS DOPPLER LOWER EXTREMITY BILATERAL (DUPLEX)-     CLINICAL INDICATION: large pe; I26.99-Other pulmonary embolism without  acute cor pulmonale        COMPARISON: None available     TECHNIQUE: Color Doppler imaging was used with compression and  augmentation to evaluate the lower extremity deep venous system.     FINDINGS:   There is patent spontaneous flow from the common femoral vein through  the posterior tibial veins.  There was no internal clot or area of noncompressibility.  Normal augmentation was elicited where applicable.  Baker's cyst in the left popliteal fossa       Impression:      No DVT in the lower extremities on today's exam.      This report was finalized on 9/28/2024 11:20 AM by Dr. Stevo Farmer MD.       CT Angiogram Chest Pulmonary Embolism [[id number]] Collected: 09/27/24 1047     Updated: 09/27/24 1051    Narrative:      CT ANGIOGRAM CHEST PULMONARY EMBOLISM-     CLINICAL INDICATION: chest pain/pain with inspiration        COMPARISON: 9/27/2024     PROCEDURE: Thin cut axial images were acquired through the pulmonary  vessels during the rapid infusion of IV contrast.     Additional 3-D reformatted images obtained via post-processing for  improved diagnostic accuracy and procedural planning.     Radiation dose reduction techniques were utilized per ALARA " protocol.  Automated exposure control was initiated through either or CareDose or  DoseRight software packages by  protocol.           FINDINGS: Large filling defect in the right main pulmonary artery  extending superiorly and inferiorly compatible with large right  pulmonary embolus..     Minimal consolidation peripherally in the right upper lobe which could  represent postobstructive process..     There is no mediastinal lymph node enlargement     No pericardial or pleural effusion.          Impression:      1. Large pulmonary embolus right main pulmonary artery  2. Results were relayed to the referring clinician.     This report was finalized on 9/27/2024 10:49 AM by Dr. Stevo Farmer MD.       US Gallbladder [[id number]] Collected: 09/27/24 0956     Updated: 09/27/24 0958    Narrative:      US GALLBLADDER-     CLINICAL INDICATION: Right-sided pain        COMPARISON: None immediately available         PROCEDURE AND FINDINGS:     Sonographic imaging of the gallbladder reveals no evidence of  cholelithiasis.  There is no pericholecystic fluid.  The wall of the gallbladder measures 2.01 mm.  The common bile duct measures 5.51 mm.             Impression:      Impression:   No evidence of cholelithiasis.     This report was finalized on 9/27/2024 9:56 AM by Dr. Stevo Farmer MD.       XR Chest 1 View [[id number]] Collected: 09/27/24 0934     Updated: 09/27/24 0937    Narrative:      XR CHEST 1 VW-     CLINICAL INDICATION: Chest Pain Triage Protocol        COMPARISON: 10/6/2015     TECHNIQUE: Single frontal view of the chest.     FINDINGS:     LUNGS: Minimal right basilar airspace disease, likely atelectasis     HEART AND MEDIASTINUM: Heart and mediastinal contours are unremarkable        SKELETON: Bony and soft tissue structures are unremarkable.             Impression:      Minimal right basilar airspace disease, likely atelectasis           This report was finalized on 9/27/2024 9:35 AM by Dr. Stevo Farmer  MD.               Discharge Disposition/Discharge Medications/Discharge Appointments     Discharge Disposition:   Home or Self Care    Discharge Follow up:   Additional Instructions for the Follow-ups that You Need to Schedule       Discharge Follow-up with PCP   As directed       Currently Documented PCP:    Jeanna Levi APRN    PCP Phone Number:    [phone]     Follow Up Details: 1 week               Follow-up Information       Jeanna Levi APRN .    Specialty: Family Medicine  Why: 1 week  Contact information:  [address] [place] [address] [phone]                             Condition at Discharge:  Stable     Discharge Medications:     Your medication list        START taking these medications        Instructions Last Dose Given Next Dose Due   Apixaban Starter Pack tablet therapy pack      Take two 5 mg tablets by mouth every 12 hours for 7 days. Followed by one 5 mg tablet every 12 hours. (Dispense starter pack if available)              CONTINUE taking these medications        Instructions Last Dose Given Next Dose Due   cholecalciferol 25 MCG (1000 UT) tablet  Commonly known as: VITAMIN D3      Take 2 tablets by mouth Daily.       diclofenac 50 MG EC tablet  Commonly known as: VOLTAREN      Take 1 tablet by mouth 2 (Two) Times a Day As Needed (pain).       lisinopril 20 MG tablet  Commonly known as: PRINIVIL,ZESTRIL      Take 1 tablet by mouth Daily.                 Where to Get Your Medications        These medications were sent to [id number] - [place], [place] - [address] AT [facility] [address] - [phone] PH - [phone] FX  [address] [place] [place] [address]      Phone: [phone]   Apixaban Starter Pack tablet therapy pack          Discharge Diet:  ad sammy    Discharge Activity:  as toelrated    Time spent on this discharge exceeded 30 minutes.

## 2024-09-30 NOTE — PAYOR COMM NOTE
"Don Tavarez (64 y.o. Male)       Date of Birth   1960    Social Security Number       Address   [address] [id number]    Home Phone   [phone]    MRN   [id number]       Advent   None    Marital Status                               Admission Date   9/27/24    Admission Type   Emergency    Admitting Provider   Acosta Linn DO    Attending Provider   Fady Camacho DO    Department, Room/Bed   [address], 3308/1S       Discharge Date       Discharge Disposition       Discharge Destination                                 Attending Provider: Fady Camacho DO    Allergies: No Known Allergies    Isolation: None   Infection: None   Code Status: CPR    Ht: 172.7 cm (68\")   Wt: 104 kg (228 lb 14.4 oz)    Admission Cmt: None   Principal Problem: Pulmonary embolism [I26.99]                   Active Insurance as of 9/27/2024       Primary Coverage       Payor Plan Insurance Group Employer/Plan Group    [facility] BLUE CROSS [employer] [id number]       Payor Plan Address Payor Plan Phone Number Payor Plan Fax Number Effective Dates    [address] [phone]  1/1/2015 - None Entered    [facility] [id number]         Subscriber Name Subscriber Birth Date Member ID       DON TAVAREZ 1960 [id number]                     Emergency Contacts        (Rel.) Home Phone Work Phone Mobile Phone    DIEUDONNE REGAN (Daughter) [phone] -- --              Vital Signs (last 2 days)       Date/Time Temp Temp src Pulse Resp BP Patient Position SpO2    09/30/24 1121 98.7 (37.1) Oral 79 20 142/81 Lying 95    09/30/24 0730 98.6 (37) Oral 77 20 138/80 Lying 95    09/30/24 0432 98.2 (36.8) Oral 78 18 151/76 Lying 96    09/29/24 2351 98.4 (36.9) Oral 80 20 140/80 Lying 96    09/29/24 1940 98.2 (36.8) Oral 83 18 147/80 Sitting 95    09/29/24 1545 97.4 (36.3) Oral 81 20 180/88 Lying 95    09/29/24 1115 98.5 (36.9) Oral 79 20 148/75 Lying 95 "    09/29/24 0633 98.7 (37.1) Oral 86 20 162/83 Lying 97    09/29/24 0343 98.2 (36.8) Oral 79 18 118/68 Lying 95    09/28/24 2329 98.4 (36.9) Oral 79 18 108/61 Lying 95    09/28/24 1923 98.3 (36.8) Oral 87 18 120/65 Lying 95    09/28/24 1500 98.5 (36.9) Oral 81 18 151/75 Lying 95    09/28/24 1125 98.7 (37.1) Oral 83 18 108/62 Lying 95    09/28/24 0650 98.5 (36.9) Oral 104 18 131/77 Lying 95    09/28/24 0338 98.7 (37.1) Oral 107 18 167/86 Lying 94          Intake & Output (last 2 days)         09/28 0701  09/29 0700 09/29 0701  09/30 0700 09/30 0701  10/01 0700    P.O. 1200 840 480    I.V. (mL/kg) 991.9 (10.2) 2859.9 (29.3)     Total Intake(mL/kg) 2191.9 (22.5) 3699.9 (37.9) 480 (4.9)    Urine (mL/kg/hr) 1150 (0.5)      Total Output 1150      Net +1041.9 +3699.9 +480           Urine Unmeasured Occurrence 1 x 10 x           Medication Administration Report for Don Tavarez as of 9/29/24 through 9/30/24     Legend:    Given Hold Not Given Due Canceled Entry Other Actions    Time Time (Time) Time Time-Action         Discontinued     Completed     Future     MAR Hold     Linked             Medications 09/29/24 09/30/24     acetaminophen (TYLENOL) tablet 650 mg  Dose: 650 mg  Freq: Every 6 Hours PRN Route: PO  PRN Reason: Mild Pain  Start: 09/27/24 2111     Admin Instructions:   If given for fever, use fever parameter: fever greater than 100.4 °F  Based on patient request - if ordered for moderate or severe pain, provider allows for administration of a medication prescribed for a lower pain scale.    Do not exceed 4 grams of acetaminophen in a 24 hr period. Max dose of 2gm for AST/ALT greater than 120 units/L.    If given for pain, use the following pain scale:   Mild Pain = Pain Score of 1-3, CPOT 1-2  Moderate Pain = Pain Score of 4-6, CPOT 3-4  Severe Pain = Pain Score of 7-10, CPOT 5-8      0800-Given                sennosides-docusate (PERICOLACE) 8.6-50 MG per tablet 2 tablet  Dose: 2 tablet  Freq: 2 Times Daily  PRN Route: PO  PRN Reason: Constipation  Start: 09/27/24 2034     Admin Instructions:   Start bowel management regimen if patient has not had a bowel movement after 12 hours.          And   polyethylene glycol (MIRALAX) packet 17 g  Dose: 17 g  Freq: Daily PRN Route: PO  PRN Reason: Constipation  PRN Comment: Use if senna-docusate is ineffective  Start: 09/27/24 2034     Admin Instructions:   Use if no bowel movement after 12 hours. Mix in 6-8 ounces of water.  Use 4-8 ounces of water, tea, or juice for each 17 gram dose.          And   bisacodyl (DULCOLAX) EC tablet 5 mg  Dose: 5 mg  Freq: Daily PRN Route: PO  PRN Reason: Constipation  PRN Comment: Use if polyethylene glycol is ineffective  Start: 09/27/24 2034     Admin Instructions:   Use if no bowel movement after 12 hours.  Swallow whole. Do not crush, split, or chew tablet.          And   bisacodyl (DULCOLAX) suppository 10 mg  Dose: 10 mg  Freq: Daily PRN Route: RE  PRN Reason: Constipation  PRN Comment: Use if bisacodyl oral is ineffective  Start: 09/27/24 2034     Admin Instructions:   Use if no bowel movement after 12 hours.  Hold for diarrhea          cholecalciferol (VITAMIN D3) tablet 2,000 Units  Dose: 2,000 Units  Freq: Daily Route: PO  Start: 09/28/24 0900      0800-Given            0841-Given              heparin 92302 units/250 mL (100 units/mL) in 0.45 % NaCl infusion  Rate: 17.94 mL/hr Dose: 18.4 Units/kg/hr  Weight Dosing Info: 97.5 kg (Dosing Weight)  Freq: Titrated Route: IV  Indications of Use: DVT/PE (active thrombosis)  Start: 09/27/24 1200     Admin Instructions:   Pharmacy dosing - VTE (PE/DVT) - Boluses (with initial bolus)      0759-Currently Infusing     0958-New Bag     1452-Currently Infusing          0046-New Bag     1025-Currently Infusing             HYDROcodone-acetaminophen (NORCO) 7.5-325 MG per tablet 1 tablet  Dose: 1 tablet  Freq: Every 6 Hours PRN Route: PO  PRN Reason: Moderate Pain  Start: 09/28/24 0851 End: 10/03/24  0850     Admin Instructions:   Based on patient request - if ordered for moderate or severe pain, provider allows for administration of a medication prescribed for a lower pain scale.  [BARBARA]    Do not exceed 4 grams of acetaminophen in a 24 hr period. Max dose of 2gm for AST/ALT greater than 120 units/L        If given for pain, use the following pain scale:   Mild Pain = Pain Score of 1-3, CPOT 1-2  Moderate Pain = Pain Score of 4-6, CPOT 3-4  Severe Pain = Pain Score of 7-10, CPOT 5-8      1709-Given               lisinopril (PRINIVIL,ZESTRIL) tablet 20 mg  Dose: 20 mg  Freq: Daily Route: PO  Start: 09/28/24 0900     Admin Instructions:   Hold for SBP less than 100, DBP less than 60.      0800-Given            0841-Given              nitroglycerin (NITROSTAT) SL tablet 0.4 mg  Dose: 0.4 mg  Freq: Every 5 Minutes PRN Route: SL  PRN Reason: Chest Pain  PRN Comment: Only if SBP Greater Than 100  Start: 09/27/24 2034     Admin Instructions:   If Pain Unrelieved After 3 Doses Notify MD  May administer up to 3 doses per episode.          Pharmacy Consult  Freq: Continuous PRN Route: XX  PRN Reason: Consult  Start: 09/30/24 1009     Order specific questions:   Consult for DOAC pricing          Pharmacy to Dose Heparin  Freq: Continuous PRN Route: XX  PRN Reason: Consult  PRN Comment: Pharmacy to dose heparin  Indications of Use: DVT/PE (active thrombosis)  Start: 09/27/24 1051     Admin Instructions:   Boluses (with initial bolus)          sodium chloride 0.9 % flush 10 mL  Dose: 10 mL  Freq: As Needed Route: IV  PRN Reason: Line Care  Start: 09/27/24 2034          sodium chloride 0.9 % flush 10 mL  Dose: 10 mL  Freq: Every 12 Hours Scheduled Route: IV  Start: 09/27/24 2130      0800-Given     2138-Given           0841-Given     2100             sodium chloride 0.9 % flush 10 mL  Dose: 10 mL  Freq: As Needed Route: IV  PRN Reason: Line Care  Start: 09/27/24 0838          sodium chloride 0.9 % infusion  Rate: 100 mL/hr  Dose: 100 mL/hr  Freq: Continuous Route: IV  Start: 09/27/24 2130 End: 09/30/24 1336      0339-New Bag     0800-Currently Infusing     1452-New Bag          0055-New Bag     1025-Currently Infusing     1038-New Bag     1347-Stopped           sodium chloride 0.9 % infusion 40 mL  Dose: 40 mL  Freq: As Needed Route: IV  PRN Reason: Line Care  Start: 09/27/24 2034     Admin Instructions:   Following administration of an IV intermittent medication, flush line with 40mL NS at 100mL/hr.          Completed Medications  Medications 09/29/24 09/30/24      aspirin chewable tablet 324 mg  Dose: 324 mg  Freq: Once Route: PO  Start: 09/27/24 0854 End: 09/27/24 0927     Admin Instructions:   Herbal/drug interaction: Avoid use with ginkgo biloba. Based on patient request - if ordered for moderate or severe pain, provider allows for administration of a medication prescribed for a lower pain scale.  Do not exceed 4 grams of aspirin in a 24 hr period.    If given for pain, use the following pain scale:   Mild Pain = Pain Score of 1-3, CPOT 1-2  Moderate Pain = Pain Score of 4-6, CPOT 3-4  Severe Pain = Pain Score of 7-10, CPOT 5-8          heparin (porcine) 5000 UNIT/ML injection 2,000 Units  Dose: 2,000 Units  Freq: Once Route: IV  Indications of Use: Other - full anticoagulation  Start: 09/28/24 1045 End: 09/28/24 1003          heparin (porcine) 5000 UNIT/ML injection 7,800 Units  Dose: 80 Units/kg  Weight Dosing Info: 97.5 kg  Freq: Once Route: IV  Indications of Use: DVT/PE (active thrombosis)  Start: 09/27/24 1200 End: 09/27/24 1209     Admin Instructions:   **Max Dose of 10,000 units**  Initial bolus            HYDROcodone-acetaminophen (NORCO) 5-325 MG per tablet 1 tablet  Dose: 1 tablet  Freq: Once As Needed Route: PO  PRN Reason: Moderate Pain  Start: 09/27/24 2235 End: 09/27/24 2332     Admin Instructions:   Based on patient request - if ordered for moderate or severe pain, provider allows for administration of a medication  prescribed for a lower pain scale.  [BARBARA]    Do not exceed 4 grams of acetaminophen in a 24 hr period. Max dose of 2gm for AST/ALT greater than 120 units/L        If given for pain, use the following pain scale:   Mild Pain = Pain Score of 1-3, CPOT 1-2  Moderate Pain = Pain Score of 4-6, CPOT 3-4  Severe Pain = Pain Score of 7-10, CPOT 5-8          HYDROmorphone (DILAUDID) injection 0.5 mg  Dose: 0.5 mg  Freq: Once Route: IV  Start: 09/27/24 1953 End: 09/27/24 2008     Admin Instructions:   If given for pain, use the following pain scale:  Mild Pain = Pain Score of 1-3, CPOT 1-2  Moderate Pain = Pain Score of 4-6, CPOT 3-4  Severe Pain = Pain Score of 7-10, CPOT 5-8          HYDROmorphone (DILAUDID) injection 0.5 mg  Dose: 0.5 mg  Freq: Once Route: IV  Start: 09/27/24 1214 End: 09/27/24 1215     Admin Instructions:   If given for pain, use the following pain scale:  Mild Pain = Pain Score of 1-3, CPOT 1-2  Moderate Pain = Pain Score of 4-6, CPOT 3-4  Severe Pain = Pain Score of 7-10, CPOT 5-8          HYDROmorphone (DILAUDID) injection 0.5 mg  Dose: 0.5 mg  Freq: Once Route: IV  Start: 09/27/24 1120 End: 09/27/24 1118     Admin Instructions:   If given for pain, use the following pain scale:  Mild Pain = Pain Score of 1-3, CPOT 1-2  Moderate Pain = Pain Score of 4-6, CPOT 3-4  Severe Pain = Pain Score of 7-10, CPOT 5-8          HYDROmorphone (DILAUDID) injection 1 mg  Dose: 1 mg  Freq: Once Route: IV  Start: 09/27/24 1353 End: 09/27/24 1430     Admin Instructions:   Based on patient request - if ordered for moderate or severe pain, provider allows for administration of a medication prescribed for a lower pain scale.      Caution: Look alike/sound alike drug alert    If given for pain, use the following pain scale:  Mild Pain = Pain Score of 1-3, CPOT 1-2  Moderate Pain = Pain Score of 4-6, CPOT 3-4  Severe Pain = Pain Score of 7-10, CPOT 5-8          iopamidol (ISOVUE-370) 76 % injection 100 mL  Dose: 100 mL  Freq:  "Once in Imaging Route: IV  Start: 09/27/24 1057 End: 09/27/24 1041          morphine injection 4 mg  Dose: 4 mg  Freq: Once Route: IV  Start: 09/27/24 1017 End: 09/27/24 1025     Admin Instructions:   Based on patient request - if ordered for moderate or severe pain, provider allows for administration of a medication prescribed for a lower pain scale.  If given for pain, use the following pain scale:  Mild Pain = Pain Score of 1-3, CPOT 1-2  Moderate Pain = Pain Score of 4-6, CPOT 3-4  Severe Pain = Pain Score of 7-10, CPOT 5-8          ondansetron (ZOFRAN) injection 4 mg  Dose: 4 mg  Freq: Once Route: IV  Start: 09/27/24 1017 End: 09/27/24 1027     Admin Instructions:   \"If multiple N/V medications ordered, use in the following order: Ondansetron, Prochlorperazine, Promethazine. Use PO unless patient refuses or patient unable to swallow.\"          Discontinued Medications  Medications 09/29/24 09/30/24      heparin (porcine) 5000 UNIT/ML injection 2,000 Units  Dose: 2,000 Units  Freq: As Needed Route: IV  PRN Comment: Bolus per pharmacy  Indications of Use: DVT/PE (active thrombosis)  Start: 09/27/24 1051 End: 09/27/24 1113          heparin (porcine) 5000 UNIT/ML injection 4,000 Units  Dose: 4,000 Units  Freq: As Needed Route: IV  PRN Comment: Bolus per pharmacy  Indications of Use: DVT/PE (active thrombosis)  Start: 09/27/24 1051 End: 09/27/24 1113                      Lab Results (last 48 hours)       Procedure Component Value Units Date/Time    Anticardiolipin Antibody, IgG / M, Qn [[id number]] Collected: 09/28/24 0856    Specimen: Blood Updated: 09/30/24 1309     Anticardiolipin IgG <9 GPL U/mL      Comment:                           Negative:              <15                            Indeterminate:     15 - 20                            Low-Med Positive: >20 - 80                            High Positive:         >80        Anticardiolipin IgM <9 MPL U/mL      Comment:                           Negative:       "        <13                            Indeterminate:     13 - 20                            Low-Med Positive: >20 - 80                            High Positive:         >80       Narrative:      Performed at:  [address] [place] [address]  [id number]  : Aubrey Hernandez PhD, Phone:  [phone]    Basic Metabolic Panel [[id number]]  (Abnormal) Collected: 09/30/24 0707    Specimen: Blood Updated: 09/30/24 0733     Glucose 92 mg/dL      BUN 9 mg/dL      Creatinine 0.81 mg/dL      Sodium 140 mmol/L      Potassium 3.9 mmol/L      Comment: Slight hemolysis detected by analyzer. Result may be falsely elevated.        Chloride 108 mmol/L      CO2 21.4 mmol/L      Calcium 8.5 mg/dL      BUN/Creatinine Ratio 11.1     Anion Gap 10.6 mmol/L      eGFR 98.5 mL/min/1.73     Narrative:      GFR Normal >60  Chronic Kidney Disease <60  Kidney Failure <15      Heparin Anti-Xa [[id number]]  (Normal) Collected: 09/30/24 0707    Specimen: Blood Updated: 09/30/24 0728     Heparin Anti-Xa (UFH) 0.38 IU/ml     CBC Auto Differential [[id number]]  (Abnormal) Collected: 09/30/24 0707    Specimen: Blood Updated: 09/30/24 0723     WBC 8.61 10*3/mm3      RBC 4.14 10*6/mm3      Hemoglobin 12.9 g/dL      Hematocrit 38.4 %      MCV 92.8 fL      MCH 31.2 pg      MCHC 33.6 g/dL      RDW 11.8 %      RDW-SD 40.0 fl      MPV 8.8 fL      Platelets 277 10*3/mm3      Neutrophil % 50.4 %      Lymphocyte % 33.2 %      Monocyte % 10.8 %      Eosinophil % 4.2 %      Basophil % 0.9 %      Immature Grans % 0.5 %      Neutrophils, Absolute 4.34 10*3/mm3      Lymphocytes, Absolute 2.86 10*3/mm3      Monocytes, Absolute 0.93 10*3/mm3      Eosinophils, Absolute 0.36 10*3/mm3      Basophils, Absolute 0.08 10*3/mm3      Immature Grans, Absolute 0.04 10*3/mm3      nRBC 0.0 /100 WBC     Factor II, DNA Analysis [[id number]]  (Normal) Collected: 09/28/24 0856    Specimen: Blood Updated: 09/30/24 0718     Factor II, DNA Analysis Normal     Narrative:      A point mutation ([id number]) in the factor II (prothrombin) gene is the second most common cause of inherited thrombophilia.  The Factor II or Prothrombin mutation refers to the G to A transition at nucleotide in the untranslated region of the gene and is associated with increased plasma levels of prothrombin.    The incidence of this mutation in the [place]  population is about 2% and in the  population it is approximately 0.5%. This mutation is rare in the  and  population. Being heterozygous for a prothrombin mutation increases the risk for developing venous thrombosis about 2 to 3 times above the general population risk. Being homozygous for the prothrombin gene mutation increases the relative risk for venous thrombosis further, although it is not yet known how much further the risk is increased. In women heterozygous for the prothrombin gene mutation, the use of estrogen containing oral contraceptives increases the relative risk of venous thrombosis about 16 times and the risk of developing cerebral thrombosis is also significantly increased. In pregnancy, the prothrombin gene mutation increases risk for venous thrombosis and may increase risk for stillbirth, placental abruption, pre-eclampsia and fetal growth restriction.     The expression of Factor II thrombophilia is impacted by coexisting genetic thrombophilic disorders, acquired thrombophilic disorders (eg, malignancy, hyperhomocysteinemia, high factor VIII levels), and circumstances including: pregnancy, oral contraceptive use, hormone replacement therapy, selective estrogen receptor modulators, travel, central venous catheters, surgery, and organ transplantation.    In order to derive the most meaningful benefit from this testing, it may be necessary that the results and subsequent options from these complex genetic tests be discussed with patients by a trained genetic professional.    Magnesium  [[id number]]  (Normal) Collected: 24    Specimen: Blood Updated: 24     Magnesium 2.1 mg/dL     Basic Metabolic Panel [[id number]]  (Abnormal) Collected: 24    Specimen: Blood Updated: 24     Glucose 91 mg/dL      BUN 11 mg/dL      Creatinine 0.78 mg/dL      Sodium 140 mmol/L      Potassium 4.0 mmol/L      Chloride 108 mmol/L      CO2 21.0 mmol/L      Calcium 8.3 mg/dL      BUN/Creatinine Ratio 14.1     Anion Gap 11.0 mmol/L      eGFR 99.6 mL/min/1.73     Narrative:      GFR Normal >60  Chronic Kidney Disease <60  Kidney Failure <15      CBC Auto Differential [[id number]]  (Abnormal) Collected: 24    Specimen: Blood Updated: 24     WBC 8.81 10*3/mm3      RBC 3.82 10*6/mm3      Hemoglobin 11.7 g/dL      Hematocrit 36.7 %      MCV 96.1 fL      MCH 30.6 pg      MCHC 31.9 g/dL      RDW 11.9 %      RDW-SD 41.1 fl      MPV 9.1 fL      Platelets 237 10*3/mm3      Neutrophil % 57.3 %      Lymphocyte % 26.0 %      Monocyte % 11.7 %      Eosinophil % 3.9 %      Basophil % 0.6 %      Immature Grans % 0.5 %      Neutrophils, Absolute 5.06 10*3/mm3      Lymphocytes, Absolute 2.29 10*3/mm3      Monocytes, Absolute 1.03 10*3/mm3      Eosinophils, Absolute 0.34 10*3/mm3      Basophils, Absolute 0.05 10*3/mm3      Immature Grans, Absolute 0.04 10*3/mm3      nRBC 0.0 /100 WBC     Heparin Anti-Xa [[id number]]  (Normal) Collected: 24    Specimen: Blood Updated: 24 0313     Heparin Anti-Xa (UFH) 0.38 IU/ml     Protein C Antigen, Total [[id number]] Collected: 24    Specimen: Blood Updated: 24 0300          Imaging Results (Last 48 Hours)       ** No results found for the last 48 hours. **                    Physician Progress Notes (last 48 hours)        Jay Jay Goodman PA-C at 24 1334          Patient Identification:  Name:  Don Tavarez  Age:  64 y.o.  Sex:  male  :  1960  MRN:  [id number]  Visit Number:   [id number]  Primary Care Provider:  Jeanna Levi APRN    Length of stay:  3    Subjective/Interval History/Consultants/Procedures     Chief Complaint:   Chief Complaint   Patient presents with    Chest Pain       Subjective/Interval History:    64 y.o. male who was admitted on 9/27/2024 with acute PE    PMH is significant for hypertension  For complete admission information, please see history and physical.     Consultations:  Pharmacy    Procedures/Scans:  CXR  CT PE protocol  Gallbladder US  Bilateral lower extremity venous Doppler US  TTE    Today, the patient was seen and examined with family member present at bedside.  He reports he is feeling clinically improved.  Work of breathing is improved, less pain on breathing.  Remains on supplemental O2 at the time of exam.  No new complaints noted today.  Reports eating and drinking well.    Room location at the time of evaluation was [facility].    ----------------------------------------------------------------------------------------------------------------------  Current [facility] Meds:  cholecalciferol, 2,000 Units, Oral, Daily  lisinopril, 20 mg, Oral, Daily  sodium chloride, 10 mL, Intravenous, Q12H      heparin, 18.4 Units/kg/hr (Dosing Weight), Last Rate: 18.4 Units/kg/hr (09/30/24 1025)  Pharmacy Consult,   Pharmacy to Dose Heparin,   sodium chloride, 100 mL/hr, Last Rate: 100 mL/hr (09/30/24 1038)      ----------------------------------------------------------------------------------------------------------------------      Objective     Vital Signs:  Temp:  [97.4 °F (36.3 °C)-98.7 °F (37.1 °C)] 98.7 °F (37.1 °C)  Heart Rate:  [77-83] 79  Resp:  [18-20] 20  BP: (138-180)/(76-88) 142/81      09/27/24  2048 09/29/24  0500 09/30/24  0534   Weight: 105 kg (230 lb 11.2 oz) 104 kg (229 lb 11.2 oz) 104 kg (228 lb 14.4 oz)     Body mass index is 34.8 kg/m².    Intake/Output Summary (Last 24 hours) at 9/30/2024 1334  Last data filed at 9/30/2024 0900  Gross per 24  hour   Intake 3339.93 ml   Output --   Net 3339.93 ml     I/O this shift:  In: 480 [P.O.:480]  Out: -   Diet: Diabetic; Consistent Carbohydrate; Fluid Consistency: Thin (IDDSI 0)  ----------------------------------------------------------------------------------------------------------------------    Physical Exam  Vitals and nursing note reviewed.   Constitutional:       General: He is not in acute distress.     Appearance: He is obese.   HENT:      Head: Normocephalic and atraumatic.   Eyes:      Extraocular Movements: Extraocular movements intact.   Cardiovascular:      Rate and Rhythm: Normal rate and regular rhythm.   Pulmonary:      Effort: Pulmonary effort is normal. No respiratory distress.   Abdominal:      Palpations: Abdomen is soft.      Tenderness: There is no abdominal tenderness.   Musculoskeletal:      Right lower leg: No edema.      Left lower leg: No edema.   Skin:     General: Skin is warm and dry.   Neurological:      Mental Status: He is alert. Mental status is at baseline.   Psychiatric:         Mood and Affect: Mood normal.         Behavior: Behavior normal.                ----------------------------------------------------------------------------------------------------------------------  Tele:      ----------------------------------------------------------------------------------------------------------------------  Results from last 7 days   Lab Units 09/27/24  1122 09/27/24  0918   HSTROP T ng/L 10 10   PROBNP pg/mL  --  67.6     Results from last 7 days   Lab Units 09/30/24  0707 09/29/24  0811 09/27/24  2351 09/27/24  0918   WBC 10*3/mm3 8.61 8.81 13.81* 13.19*   HEMOGLOBIN g/dL 12.9* 11.7* 13.1 14.0   HEMATOCRIT % 38.4 36.7* 38.7 41.4   MCV fL 92.8 96.1 93.3 93.9   MCHC g/dL 33.6 31.9 33.9 33.8   PLATELETS 10*3/mm3 277 237 224 241   INR   --   --   --  0.94     Results from last 7 days   Lab Units 09/27/24  1102   PH, ARTERIAL pH units 7.424   PO2 ART mm Hg 50.3*   PCO2, ARTERIAL mm Hg  "36.4   HCO3 ART mmol/L 23.8     Results from last 7 days   Lab Units 09/30/24  0707 09/29/24  0811 09/27/24  2351 09/27/24  0918   SODIUM mmol/L 140 140 135* 137   POTASSIUM mmol/L 3.9 4.0 4.2 4.2   MAGNESIUM mg/dL  --  2.1  --   --    CHLORIDE mmol/L 108* 108* 102 104   CO2 mmol/L 21.4* 21.0* 21.7* 20.9*   BUN mg/dL 9 11 16 15   CREATININE mg/dL 0.81 0.78 1.00 1.07   CALCIUM mg/dL 8.5* 8.3* 8.7 9.3   GLUCOSE mg/dL 92 91 110* 111*   ALBUMIN g/dL  --   --   --  4.0   BILIRUBIN mg/dL  --   --   --  1.1   ALK PHOS U/L  --   --   --  92   AST (SGOT) U/L  --   --   --  21   ALT (SGPT) U/L  --   --   --  30   Estimated Creatinine Clearance: 107.6 mL/min (by C-G formula based on SCr of 0.81 mg/dL).  No results found for: \"AMMONIA\"      No results found for: \"BLOODCX\"  No results found for: \"URINECX\"  No results found for: \"WOUNDCX\"  No results found for: \"STOOLCX\"  ----------------------------------------------------------------------------------------------------------------------  Imaging Results (Last 24 Hours)       ** No results found for the last 24 hours. **          ----------------------------------------------------------------------------------------------------------------------   I have reviewed the above laboratory values for 09/30/24    Assessment/Plan     Active Hospital Problems    Diagnosis  POA    **Pulmonary embolism [I26.99]  Yes         ASSESSMENT/PLAN:    Large acute right-sided pulmonary embolism  Acute respiratory failure with hypoxia, 2/2 above  He was started on heparin infusion on admission.  Pharmacy is dosing, assistance appreciated.  Monitor per protocol.  Will also consult pharmacy for DOAC pricing, appreciated.  Hypercoagulability workup sent and pending.  If stable for discharge prior to results may follow-up on these as an outpatient.  Patient reports pain improving, using pain medication less frequently per MAR.  Labs and VSS stable.  Remains on 2 L per nasal cannula with O2 sats mid " 90s.  Will wean as able.    Hypertension  Lisinopril continued  -----------  -DVT prophylaxis: Currently on heparin drip  -Disposition plans/anticipated needs: Pending further clinical course.  Potential discharge within 24 to 48 hours if remaining clinically stable.        The patient is high risk due to the following diagnoses/reasons: Acute pulmonary embolism        Jay Jay Goodman PA-C  24  13:34 EDT     Electronically signed by Jay Jay Goodman PA-C at 24 1339       Acosta Linn DO at 24 1413              [facility]     Patient Identification:  Name:  Don Tavarez  Age:  64 y.o.  Sex:  male  :  1960  MRN:  [id number]  Visit Number:  [id number]  ROOM: [address] [place] [address]     Primary Care Provider:  Jeanna Levi APRN     Date of Admission: 2024    Length of stay in inpatient status:  2    Subjective     Chief Compliant:    Chief Complaint   Patient presents with    Chest Pain     History of Presenting Illness:    Patient says that he is breathing much better overall.  Patient's pain continues to improve.  He is very tired today as he says he did not sleep much last night.      Current Hospital Meds:  cholecalciferol, 2,000 Units, Oral, Daily  lisinopril, 20 mg, Oral, Daily  sodium chloride, 10 mL, Intravenous, Q12H    heparin, 18.4 Units/kg/hr (Dosing Weight), Last Rate: 18.4 Units/kg/hr (24 0958)  Pharmacy to Dose Heparin,   sodium chloride, 100 mL/hr, Last Rate: 100 mL/hr (24 0800)      Current Antimicrobial Therapy:  Anti-Infectives (From admission, onward)      None          Current Diuretic Therapy:  Diuretics (From admission, onward)      None          ----------------------------------------------------------------------------------------------------------------------  Vital Signs:  Temp:  [98.2 °F (36.8 °C)-98.7 °F (37.1 °C)] 98.5 °F (36.9 °C)  Heart Rate:  [79-87] 79  Resp:  [18-20] 20  BP: (108-162)/(61-83)  148/75  SpO2:  [95 %-97 %] 95 %  on  Flow (L/min):  [2] 2;   Device (Oxygen Therapy): nasal cannula  Body mass index is 34.93 kg/m².    Wt Readings from Last 3 Encounters:   09/29/24 104 kg (229 lb 11.2 oz)   04/21/16 90.7 kg (200 lb)     Intake & Output (last 3 days)         09/25 0701 09/26 0700 09/26 0701 09/27 0700 09/27 0701 09/28 0700 09/28 0701 09/29 0700    P.O.   240     I.V. (mL/kg)   1203.2 (12.3)     Total Intake(mL/kg)   1443.2 (14.8)     Urine (mL/kg/hr)   500     Total Output   500     Net   +943.2                   Diet: Diabetic; Consistent Carbohydrate; Fluid Consistency: Thin (IDDSI 0)  ----------------------------------------------------------------------------------------------------------------------  Physical Exam    Constitutional:  Well-developed and well-nourished.  No acute distress.      HENT:  Head:  Normocephalic and atraumatic.  Mouth:  Moist mucous membranes.    Eyes:  Conjunctivae and EOM are normal. No scleral icterus.    Neck:  Neck supple.  No JVD present.    Cardiovascular:  Normal rate, regular rhythm and normal heart sounds with no murmur.  Pulmonary/Chest:  No respiratory distress, no wheezes, no crackles, with normal breath sounds and good air movement.  Abdominal:  Soft. No distension and no tenderness.   Musculoskeletal:  No tenderness, and no deformity.  No red or swollen joints anywhere.    Neurological:  Alert and oriented to person, place, and time.  No cranial nerve deficit.    Skin:  Skin is warm and dry. No rash noted. No pallor.   Peripheral vascular:  No clubbing, no cyanosis, no edema.  Psychiatric: Appropriate mood and affect  :    ----------------------------------------------------------------------------------------------------------------------  Tele:    ----------------------------------------------------------------------------------------------------------------------  LABS:    CBC and coagulation:  Results from last 7 days   Lab Units 09/29/24  0811  "09/27/24 2351 09/27/24 0918   WBC 10*3/mm3 8.81 13.81* 13.19*   HEMOGLOBIN g/dL 11.7* 13.1 14.0   HEMATOCRIT % 36.7* 38.7 41.4   MCV fL 96.1 93.3 93.9   MCHC g/dL 31.9 33.9 33.8   PLATELETS 10*3/mm3 237 224 241   INR   --   --  0.94   D DIMER QUANT MCGFEU/mL  --   --  0.86*     Acid/base balance:  Results from last 7 days   Lab Units 09/27/24  1102   PH, ARTERIAL pH units 7.424   PO2 ART mm Hg 50.3*   PCO2, ARTERIAL mm Hg 36.4   HCO3 ART mmol/L 23.8     Renal and electrolytes:  Results from last 7 days   Lab Units 09/29/24  0811 09/27/24 2351 09/27/24 0918   SODIUM mmol/L 140 135* 137   POTASSIUM mmol/L 4.0 4.2 4.2   MAGNESIUM mg/dL 2.1  --   --    CHLORIDE mmol/L 108* 102 104   CO2 mmol/L 21.0* 21.7* 20.9*   BUN mg/dL 11 16 15   CREATININE mg/dL 0.78 1.00 1.07   CALCIUM mg/dL 8.3* 8.7 9.3   GLUCOSE mg/dL 91 110* 111*     Estimated Creatinine Clearance: 111.8 mL/min (by C-G formula based on SCr of 0.78 mg/dL).    Liver and pancreatic function:  Results from last 7 days   Lab Units 09/27/24 0918   ALBUMIN g/dL 4.0   BILIRUBIN mg/dL 1.1   ALK PHOS U/L 92   AST (SGOT) U/L 21   ALT (SGPT) U/L 30     Endocrine function:  No results found for: \"HGBA1C\"  Point of care bedside glucose levels:      Glucose levels from the [facility]:  Results from last 7 days   Lab Units 09/29/24  0811 09/27/24 2351 09/27/24 0918   GLUCOSE mg/dL 91 110* 111*     No results found for: \"TSH\", \"FREET4\"  Cardiac:  Results from last 7 days   Lab Units 09/27/24  1122 09/27/24 0918   HSTROP T ng/L 10 10   PROBNP pg/mL  --  67.6       Cultures:  No results found for: \"COLORU\", \"CLARITYU\", \"SPECGRAV\", \"PHUR\", \"PROTEINUR\", \"GLUCOSEU\", \"KETONESU\", \"BLOODU\", \"NITRITEU\", \"LEUKOCYTESUR\", \"BILIRUBINUR\", \"UROBILINOGEN\", \"RBCUA\", \"WBCUA\", \"BACTERIA\", \"UACOMMENT\"  Microbiology Results (last 10 days)       ** No results found for the last 240 hours. **            No results found for: \"PREGTESTUR\", \"PREGSERUM\", \"HCG\", \"HCGQUANT\"  Pain Management Panel        "    No data to display                I have personally looked at the labs and they are summarized above.  ----------------------------------------------------------------------------------------------------------------------  Detailed radiology reports for the last 24 hours:    Imaging Results (Last 24 Hours)       ** No results found for the last 24 hours. **          I have personally looked at the radiology images and I have read the available final and preliminary reports.    Assessment & Plan    Large acute right-sided pulmonary embolism  Acute respiratory failure with hypoxia  The workup has been somewhat benign thus far.  The patient's venous Dopplers have been negative and he is 2D echo was negative as well  Labs for clotting disorder.  Still pending  We will continue the patient on a heparin drip  Will consult  to determine whether the patient's insurance will cover Eliquis  Will get more aggressive with the patient's pain medication  Bilateral lower extremity venous Dopplers    Hypertension  I will review and reconcile patient's home medication    VTE Prophylaxis:   Active VTE Prophylaxis:  Pharmacologic:        Start     Dose Route Frequency Stop    09/27/24 1200  heparin 59116 units/250 mL (100 units/mL) in 0.45 % NaCl infusion  17.94 mL/hr         18.4 Units/kg/hr (Dosing Weight) IV Titrated --    09/27/24 1051  Pharmacy to Dose Heparin         -- XX Continuous PRN --                  Select Mechanical VTE Prophylaxis if Desired & Appropriate     The patient is high risk due to the following diagnoses/reasons: Hypoxia    Disposition: Continue current admission    Acosta Linn DO  [facility]  09/29/24  14:13 EDT     Electronically signed by Acosta Linn DO at 09/29/24 4965

## 2024-10-01 LAB
AT III ACT/NOR PPP CHRO: 67 % (ref 75–135)
FACT V ACT/NOR PPP: 127 % (ref 70–150)
PROT C ACT/NOR PPP: 93 % (ref 73–180)
PROT C AG ACT/NOR PPP IA: 69 % (ref 60–150)
PROT S ACT/NOR PPP: 66 % (ref 63–140)
PROT S AG ACT/NOR PPP IA: 91 % (ref 60–150)
PS IGG SER-ACNC: 56 UNITS (ref 0–30)
PS IGM SER-ACNC: <10 UNITS (ref 0–30)

## 2024-10-01 NOTE — PAYOR COMM NOTE
"CONTACT:  ALIA BRENNAN MSN, RN  UTILIZATION MANAGEMENT DEPT.  [facility]  [facility] [address], [address]  PHONE:  [phone]  FAX: [phone]    PATIENT DISCHARGED TO HOME ON 9/30/24, AWAITING APPROVAL OF ADDITIONAL DAYS.    REFER TO AUTH # [id number]     Don Tavarez (64 y.o. Male)       Date of Birth   1960    Social Security Number       Address   [address] [address]    Home Phone   [phone]    MRN   [id number]       Confucianism   None    Marital Status                               Admission Date   9/27/24    Admission Type   Emergency    Admitting Provider   Acosta Linn DO    Attending Provider       Department, Room/Bed   [address], 3308/1S       Discharge Date   9/30/2024    Discharge Disposition   Home or Self Care    Discharge Destination                                 Attending Provider: (none)   Allergies: No Known Allergies    Isolation: None   Infection: None   Code Status: Prior    Ht: 172.7 cm (68\")   Wt: 104 kg (228 lb 14.4 oz)    Admission Cmt: None   Principal Problem: Pulmonary embolism [I26.99]                   Active Insurance as of 9/27/2024       Primary Coverage       Payor Plan Insurance Group Employer/Plan Group    [facility] BLUE [employer] EMPLOYEE [id number]       Payor Plan Address Payor Plan Phone Number Payor Plan Fax Number Effective Dates    [address] [phone]  1/1/2015 - None Entered    [place] [address]         Subscriber Name Subscriber Birth Date Member ID       DON TAVAREZ 1960 [id number]                     Emergency Contacts        (Rel.) Home Phone Work Phone Mobile Phone    DIEUDONNE REGAN (Daughter) [phone] -- --                 Discharge Summary        Jay Jay Goodman PA-C at 09/30/24 1446       Attestation signed by Fady Camacho DO at 09/30/24 1539    Patient seen and examined independently of Hiren Goodman PA-C.  I agree with HPI, " Hospital Course, Discharge Diagnoses, Discharge Medications and Discharge Plans.                      [facility] HOSPITALIST DISCHARGE SUMMARY    Patient Identification:  Name:  Don Tavarez  Age:  64 y.o.  Sex:  male  :  1960  MRN:  [id number]  Visit Number:  [id number]    Date of Admission: 2024  Date of Discharge:  24     PCP: Jeanna Levi APRN    Discharging Provider: Hiren Goodman PA-C / Dr. Camacho    Discharge Diagnoses     Discharge Diagnoses:  Acute, unprovoked, right-sided pulmonary embolism  Acute hypoxic respiratory failure, due to above and resolved    Secondary Diagnoses:  Hypertension    Needs on follow up:  PCP 1 week, hematology 3 months  Consults/Procedures     Consults:   Consults       No orders found from 2024 to 2024.            Procedures/Scans Performed:  CXR  CT PE protocol  Gallbladder US  Bilateral lower extremity venous Doppler US  TTE       History of Presenting Illness     Chief Complaint   Patient presents with    Chest Pain       Patient is a 64 y.o. male who presented to [facility] complaining of chest pain.  Please see the admitting history and physical for further details.      Hospital Course     Patient was admitted to [facility] on 2024 following presentation to [facility] ED for further evaluation of chest pain.  Patient reported onset of sharp, stabbing right-sided chest pain about 24 hours prior to arrival.  Pain was worse with breathing or coughing.  No cough or sputum production noted.  He denied recent illness-reported was exposed to COVID-19 about 2 weeks prior to this but never developed any symptoms.  No recent prolonged immobilization or surgeries.  He is a non-smoker.  No history of DVT or PE.  He was tachycardic in the ED, ABG showed pO2 50.3 on room air.  Troponin was negative x 2 but D-dimer was elevated at 0.86.  CT PE protocol showed large pulmonary embolism in the right pulmonary artery.  EKG showed right bundle  branch block.  He was started on a heparin drip and admitted for further workup and management.    As needed pain regimen was continued on admission.  TTE was obtained to further evaluate-noted normal LVEF, mild concentric left ventricular hypertrophy, grade 1 diastolic dysfunction.  We also obtained bilateral lower extremity venous Doppler US which was negative for DVT.  Hypercoagulability workup was sent and remains pending at the time of this documentation.  With treatment regimen outlined above patient did improve day over day and was weaned to room air on the date of this documentation without difficulty.  Given no further inpatient workup planned and patient remaining clinically stable Case was discussed with attending physician and agree he is stable for discharge home on this date.  We will start Eliquis at discharge and recommend continuing x 6 months given suspected unprovoked nature of PE.  We will have the patient follow-up with PCP within 1 week and refer for hematology consultation in about 3 months.  This discharge and follow-up plan was discussed with the patient and family member at bedside on the date of discharge and expressed agreement understanding.    Discharge Vitals/Physical Examination     Vital Signs:  Temp:  [97.4 °F (36.3 °C)-98.7 °F (37.1 °C)] 98.7 °F (37.1 °C)  Heart Rate:  [77-83] 79  Resp:  [18-20] 20  BP: (138-180)/(76-88) 142/81  Mean Arterial Pressure (Non-Invasive) for the past 24 hrs (Last 3 readings):   Noninvasive MAP (mmHg)   09/30/24 1121 100   09/30/24 0432 98   09/29/24 2351 97     SpO2 Percentage    09/30/24 0432 09/30/24 0730 09/30/24 1121   SpO2: 96% 95% 95%     SpO2:  [95 %-96 %] 95 %  on  Flow (L/min):  [2] 2;   Device (Oxygen Therapy): nasal cannula    Body mass index is 34.8 kg/m².  Wt Readings from Last 3 Encounters:   09/30/24 104 kg (228 lb 14.4 oz)   04/21/16 90.7 kg (200 lb)         Physical Exam:  Physical Exam  Vitals and nursing note reviewed.    Constitutional:       General: He is not in acute distress.  HENT:      Head: Normocephalic and atraumatic.   Eyes:      Extraocular Movements: Extraocular movements intact.   Cardiovascular:      Rate and Rhythm: Normal rate.   Pulmonary:      Effort: Pulmonary effort is normal. No respiratory distress.   Abdominal:      Palpations: Abdomen is soft.   Musculoskeletal:      Right lower leg: No edema.      Left lower leg: No edema.   Skin:     General: Skin is warm and dry.   Neurological:      Mental Status: He is alert. Mental status is at baseline.   Psychiatric:         Mood and Affect: Mood normal.         Behavior: Behavior normal.         Pertinent Laboratory/Radiology Results     Pertinent Laboratory Results:  Results from last 7 days   Lab Units 09/27/24  1122 09/27/24  0918   HSTROP T ng/L 10 10     Results from last 7 days   Lab Units 09/27/24  0918   PROBNP pg/mL 67.6       Results from last 7 days   Lab Units 09/27/24  1102   PH, ARTERIAL pH units 7.424   PO2 ART mm Hg 50.3*   PCO2, ARTERIAL mm Hg 36.4   HCO3 ART mmol/L 23.8     Results from last 7 days   Lab Units 09/30/24  0707 09/29/24  0811 09/27/24  2351 09/27/24  0918   WBC 10*3/mm3 8.61 8.81 13.81* 13.19*   HEMOGLOBIN g/dL 12.9* 11.7* 13.1 14.0   HEMATOCRIT % 38.4 36.7* 38.7 41.4   MCV fL 92.8 96.1 93.3 93.9   MCHC g/dL 33.6 31.9 33.9 33.8   PLATELETS 10*3/mm3 277 237 224 241   INR   --   --   --  0.94     Results from last 7 days   Lab Units 09/30/24  0707 09/29/24  0811 09/27/24  2351 09/27/24  0918   SODIUM mmol/L 140 140 135* 137   POTASSIUM mmol/L 3.9 4.0 4.2 4.2   MAGNESIUM mg/dL  --  2.1  --   --    CHLORIDE mmol/L 108* 108* 102 104   CO2 mmol/L 21.4* 21.0* 21.7* 20.9*   BUN mg/dL 9 11 16 15   CREATININE mg/dL 0.81 0.78 1.00 1.07   CALCIUM mg/dL 8.5* 8.3* 8.7 9.3   GLUCOSE mg/dL 92 91 110* 111*   ALBUMIN g/dL  --   --   --  4.0   BILIRUBIN mg/dL  --   --   --  1.1   ALK PHOS U/L  --   --   --  92   AST (SGOT) U/L  --   --   --  21   ALT  "(SGPT) U/L  --   --   --  30   Estimated Creatinine Clearance: 107.6 mL/min (by C-G formula based on SCr of 0.81 mg/dL).  No results found for: \"AMMONIA\"    No results found for: \"HGBA1C\", \"POCGLU\"  No results found for: \"HGBA1C\"  No results found for: \"TSH\", \"FREET4\"    No results found for: \"BLOODCX\"  No results found for: \"URINECX\"  No results found for: \"WOUNDCX\"  No results found for: \"STOOLCX\"  No results found for: \"RESPCX\"  Pain Management Panel           No data to display                Pertinent Radiology Results:  Imaging Results (All)       Procedure Component Value Units Date/Time    US Venous Doppler Lower Extremity Bilateral (duplex) [[id number]] Collected: 09/28/24 1120     Updated: 09/28/24 1122    Narrative:      US VENOUS DOPPLER LOWER EXTREMITY BILATERAL (DUPLEX)-     CLINICAL INDICATION: large pe; I26.99-Other pulmonary embolism without  acute cor pulmonale        COMPARISON: None available     TECHNIQUE: Color Doppler imaging was used with compression and  augmentation to evaluate the lower extremity deep venous system.     FINDINGS:   There is patent spontaneous flow from the common femoral vein through  the posterior tibial veins.  There was no internal clot or area of noncompressibility.  Normal augmentation was elicited where applicable.  Baker's cyst in the left popliteal fossa       Impression:      No DVT in the lower extremities on today's exam.      This report was finalized on 9/28/2024 11:20 AM by Dr. Stevo Farmer MD.       CT Angiogram Chest Pulmonary Embolism [[id number]] Collected: 09/27/24 1047     Updated: 09/27/24 1051    Narrative:      CT ANGIOGRAM CHEST PULMONARY EMBOLISM-     CLINICAL INDICATION: chest pain/pain with inspiration        COMPARISON: 9/27/2024     PROCEDURE: Thin cut axial images were acquired through the pulmonary  vessels during the rapid infusion of IV contrast.     Additional 3-D reformatted images obtained via post-processing for  improved diagnostic " accuracy and procedural planning.     Radiation dose reduction techniques were utilized per ALARA protocol.  Automated exposure control was initiated through either or CareDoSuperpedestrian or  DoseRigEdumedics software packages by  protocol.           FINDINGS: Large filling defect in the right main pulmonary artery  extending superiorly and inferiorly compatible with large right  pulmonary embolus..     Minimal consolidation peripherally in the right upper lobe which could  represent postobstructive process..     There is no mediastinal lymph node enlargement     No pericardial or pleural effusion.          Impression:      1. Large pulmonary embolus right main pulmonary artery  2. Results were relayed to the referring clinician.     This report was finalized on 9/27/2024 10:49 AM by Dr. Stevo Farmer MD.       US Gallbladder [[id number]] Collected: 09/27/24 0956     Updated: 09/27/24 0958    Narrative:      US GALLBLADDER-     CLINICAL INDICATION: Right-sided pain        COMPARISON: None immediately available         PROCEDURE AND FINDINGS:     Sonographic imaging of the gallbladder reveals no evidence of  cholelithiasis.  There is no pericholecystic fluid.  The wall of the gallbladder measures 2.01 mm.  The common bile duct measures 5.51 mm.             Impression:      Impression:   No evidence of cholelithiasis.     This report was finalized on 9/27/2024 9:56 AM by Dr. Stevo Farmer MD.       XR Chest 1 View [[id number]] Collected: 09/27/24 0934     Updated: 09/27/24 0937    Narrative:      XR CHEST 1 VW-     CLINICAL INDICATION: Chest Pain Triage Protocol        COMPARISON: 10/6/2015     TECHNIQUE: Single frontal view of the chest.     FINDINGS:     LUNGS: Minimal right basilar airspace disease, likely atelectasis     HEART AND MEDIASTINUM: Heart and mediastinal contours are unremarkable        SKELETON: Bony and soft tissue structures are unremarkable.             Impression:      Minimal right basilar airspace  disease, likely atelectasis           This report was finalized on 9/27/2024 9:35 AM by Dr. Stevo Farmer MD.               Discharge Disposition/Discharge Medications/Discharge Appointments     Discharge Disposition:   Home or Self Care    Discharge Follow up:   Additional Instructions for the Follow-ups that You Need to Schedule       Discharge Follow-up with PCP   As directed       Currently Documented PCP:    Jeanna Levi APRN    PCP Phone Number:    [phone]     Follow Up Details: 1 week               Follow-up Information       Jeanna Levi APRN .    Specialty: Family Medicine  Why: 1 week  Contact information:  [address] [place] [id number] [phone]                             Condition at Discharge:  Stable     Discharge Medications:     Your medication list        START taking these medications        Instructions Last Dose Given Next Dose Due   Apixaban Starter Pack tablet therapy pack      Take two 5 mg tablets by mouth every 12 hours for 7 days. Followed by one 5 mg tablet every 12 hours. (Dispense starter pack if available)              CONTINUE taking these medications        Instructions Last Dose Given Next Dose Due   cholecalciferol 25 MCG (1000 UT) tablet  Commonly known as: VITAMIN D3      Take 2 tablets by mouth Daily.       diclofenac 50 MG EC tablet  Commonly known as: VOLTAREN      Take 1 tablet by mouth 2 (Two) Times a Day As Needed (pain).       lisinopril 20 MG tablet  Commonly known as: PRINIVIL,ZESTRIL      Take 1 tablet by mouth Daily.                 Where to Get Your Medications        These medications were sent to [id number] - [place] - [address] AT [facility] [address] - [phone] PH - [phone]   [address] [place] [address]      Phone: [phone]   Apixaban Starter Pack tablet therapy pack          Discharge Diet:  ad sammy    Discharge Activity:  as toelrated    Time spent on this  discharge exceeded 30 minutes.      Electronically signed by Fady Camacho DO at 09/30/24 6580

## 2024-10-02 LAB — F5 GENE MUT ANL BLD/T: NORMAL

## 2024-10-30 ENCOUNTER — TELEPHONE (OUTPATIENT)
Dept: ONCOLOGY | Facility: CLINIC | Age: 64
End: 2024-10-30
Payer: COMMERCIAL

## 2024-10-30 NOTE — TELEPHONE ENCOUNTER
Caller: Don Tavarez    Relationship: Self    Best call back number: [phone]    What is the best time to reach you: ANYTIME    Who are you requesting to speak with (clinical staff, provider,  specific staff member): CLINICAL         What was the call regarding:     SEEN IN [facility] AND WAS PRESCRIBED BY DR ATKINS AT [facility]  AND THEY HAD REFERRED FOR APPT WITH DR CALZADA, BUT APPT IS NOT UNTIL 12/30    TAKES [facility], WANTED TO SEE IF DR CALZADA COULD SEND NEW RX IN OR FIND OUT WHO SHOULD  REQUEST THIS RX FROM.       PHARMACY :[id number] - [place], [place] - [address] [facility] [address] - [phone] PH - [phone]     Is it okay if the provider responds through MyChart: NO

## 2024-10-30 NOTE — TELEPHONE ENCOUNTER
RN spoke with Dr. Bradley in regard to patients request.  MD stated that patient would follow with PCP until seen by her in December, also relayed that patient hasn't been seen by us yet and we were unable to send prescription for anticoagulation.  Also generally patient with history of DVT or PE are seen a couple times for hypercoagulable workup and recommendations and then to follow with PCP after.      RN relayed this information to Don, and he stated he had went up to his PCP office today, and they told him to call our clinic. RN relayed this information to MD and MD spoke with clinic manager.  Clinic manager then called patient's PCP office and spoke with the  there, who relayed that the patient would be taken care of.    RN called and relayed this information to Don, who verbalized understanding and had no further questions/concerns at this time.

## 2024-12-27 NOTE — PROGRESS NOTES
Don Tavarez  [id number]  1960 12/30/2024      Referring Provider:   Jay Jay Goodman PA-C    Reason for Consultation:   Pulmonary Embolism (PE)     Chief Complaint:  Pulmonary Embolism (PE)      History of Present Illness   Don Tavarez is a very pleasant 64 y.o.  male who presents in new consultation at the request of DEWEY Osuna for further management and evaluation of pulmonary embolism.    He presented to [facility] on 9/27/24 with right sided pleuritic chest pain that had been occurring for a day prior. While he denied of any shortness of breath he experienced pleuritic chest pain. CT PE protocol showed large pulmonary embolism in the right pulmonary artery and was started on heparin drip. TTE obtained to further evaluate which noted normal LVEF, mild concentric left ventricular hypertrophy, grade 1 diastolic dysfunction. Bilateral lower extremity venous duplex. He was discharged on Eliquis and his symptoms have resolved. He denies of any prolonged period of immobilization, no trauma or hospitalizations. He denies of any No recent prolonged immobilization or surgeries. Denies of any COVID infection or vaccine prior to PE. He is a non-smoker and has no personal or family history that he is aware for thrombosis.               The following portions of the patient's history were reviewed and updated as appropriate: allergies, current medications, past family history, past medical history, past social history, past surgical history and problem list.      No Known Allergies        Past Medical History:   Diagnosis Date    Arthritis     History of blood clots     Hypertension          Past Surgical History:   Procedure Laterality Date    JOINT REPLACEMENT Left 10/2015    Dr Nesbitt in [place]    JOINT REPLACEMENT Right 04/2014    Dr Nesbitt in [place]         Social History     Socioeconomic History    Marital status:    Tobacco Use    Smoking status: Never      Passive exposure: Never    Smokeless tobacco: Never   Vaping Use    Vaping status: Never Used   Substance and Sexual Activity    Alcohol use: Never    Drug use: Never    Sexual activity: Defer           Family History   Problem Relation Age of Onset    Congenital heart disease Mother     Heart attack Father     Cancer Colon Sister 50s    Cancer Colon Sister 60s    Heart attack Brother     Cancer Lung Brother 60s         Current Outpatient Medications:     diclofenac (VOLTAREN) 50 MG EC tablet, Take 1 tablet by mouth 2 (Two) Times a Day., Disp: , Rfl:     Eliquis 5 MG tablet tablet, Take 1 tablet by mouth Every 12 (Twelve) Hours., Disp: , Rfl:     lisinopril (PRINIVIL,ZESTRIL) 20 MG tablet, Take 1 tablet by mouth Daily., Disp: , Rfl:     Cholecalciferol 25 MCG (1000 UT) tablet, Take 2 tablets by mouth Daily., Disp: , Rfl:         Review of Systems  Constitutional: No fever, chills, night sweats or weight loss.   HEENT:  No headaches, vision changes or hearing changes, no sinus drainage, sore throat.   Cardiovascular:  No palpitations, chest pain, syncopal episodes or edema.   Pulmonary:  No shortness of breath, hemoptysis, or cough.   Gastrointestinal:  No nausea or vomiting.  No constipation or diarrhea. No abdominal pain. No melena or hematochezia.   Genitourinary:  No hematuria, or changes in urination.   Musculoskeletal:  No pain, or joint problems.   Skin: No rashes or pruritus.   Endocrine:  No hot flashes or chills   Hematologic:  No history of free bleeding, spontaneous bleeding or clotting problems. No lymphadenopathy.    Immunologic:  No allergies or frequent infections.   Neurologic: No numbness, tingling, or weakness.   Psychiatric:  No anxiety or depression.           Physical Exam  Vital Signs: These were reviewed and listed as per patient’s electronic medical chart  Vitals:    12/30/24 1253   BP: 150/77   Pulse: 86   Resp: 18   Temp: 97.5 °F (36.4 °C)   SpO2: 96%     General: Awake, alert and oriented,  in no distress  HEENT: Head is atraumatic, normocephalic, extraocular movements full, no scleral icterus  Neck: supple, no jvd, lymphadenopathy or masses  Cardiovascular: regular rate and rhythm without murmurs, rubs or gallops  Pulmonary: non-labored, clear to auscultation bilaterally, no wheezing  Abdomen: soft, non-tender, non-distended, normal active bowel sounds present, no organomegaly  Extremities: No clubbing, cyanosis or edema  Lymph: No cervical, supraclavicular adenopathy  Neurologic: Mental status as above, alert, awake and oriented, grossly non-focal exam  Skin: warm, dry, intact        Pain Score:  Pain Score    12/30/24 1253   PainSc: 0-No pain           PHQ-Score Total:  PHQ-9 Total Score:          IMAGING:  CT Angiogram Chest Pulmonary Embolism (09/27/2024 10:40)   FINDINGS: Large filling defect in the right main pulmonary artery extending superiorly and inferiorly compatible with large right pulmonary embolus..Minimal consolidation peripherally in the right upper lobe which could represent postobstructive process..There is no mediastinal lymph node enlargement. No pericardial or pleural effusion.  IMPRESSION:1. Large pulmonary embolus right main pulmonary artery. 2. Results were relayed to the referring clinician.        LABS/STUDIES:  No results displayed because visit has over 200 results.                   Assessment & Plan   Don Tavarez is a very pleasant 64 y.o.  male who presents in new consultation at the request of DEWEY Osuna for further management and evaluation of pulmonary embolism.    Right pulmonary embolism  - Given that it appears unprovoked patient had a partial hypercoagulable workup as an inpatient. Factor V Leiden and Prothrombin gene mutations are negative. Will obtain full hypercoagulable workup today. Regardless of workup would recommend lifelong anticoagulation given that it was unprovoked unless contraindication should exist. I spent a long time  today discussing the risks and benefits of lifelong anticoagulation and he is agreeable.  We also discussed full dose versus prophylactic anticoagulation and he would like to continue with full dose anticoagulation.  - He understands the risk of bleeding while being on anticoagulation and should spontaneous or abnormal bleeding occur he was advised to seek immediate medical attention. Recommended against NSAID use or high impact activities while on anticoagulation, as this places him at increased risk for bleeding. I have also recommended buying a medical band stating that he is on anticoagulation so that if he were unconscious or in an accident, this would alert medical personal.   - He was also advised of possible thrombosis symptoms (which include symptoms of myocardial infarction, CVA, DVT, and pulmonary emboli) and understands that should this occur he should also seek immediate medical attention.  - Will have him return in follow-up in 3 to 4 weeks to review workup.    ACO / ADRIANE/Other  Quality measures  -  Don Tavarez  reports that he has never smoked. He has never been exposed to tobacco smoke. He has never used smokeless tobacco.   -  Don Tavarez did not receive 2024 flu vaccine.  This was recommended but declined.  -  Don Tavarez reports a pain score of 0.  Given his pain assessment as noted, treatment options were discussed and the following options were decided upon as a follow-up plan to address the patient's pain: continuation of current treatment plan for pain.  -  Current outpatient and discharge medications have been reconciled for the patient.  Reviewed by: Nasrin Bradley MD      I will have the patient return in follow up appointment to review test results in three weeks. He understands that should he have any questions or concerns prior to his appointment he should give us a call at any time and I would be happy to see him sooner. It was a pleasure to see this patient in clinic today,  thank you for allowing me to participate in the care of this patient.      Nasrin Bradley MD   12/30/24   13:13 EST

## 2024-12-30 ENCOUNTER — CONSULT (OUTPATIENT)
Dept: ONCOLOGY | Facility: CLINIC | Age: 64
End: 2024-12-30
Payer: COMMERCIAL

## 2024-12-30 ENCOUNTER — LAB (OUTPATIENT)
Dept: ONCOLOGY | Facility: CLINIC | Age: 64
End: 2024-12-30
Payer: COMMERCIAL

## 2024-12-30 VITALS
HEIGHT: 68 IN | RESPIRATION RATE: 18 BRPM | OXYGEN SATURATION: 96 % | WEIGHT: 230.8 LBS | SYSTOLIC BLOOD PRESSURE: 150 MMHG | BODY MASS INDEX: 34.98 KG/M2 | DIASTOLIC BLOOD PRESSURE: 77 MMHG | HEART RATE: 86 BPM | TEMPERATURE: 97.5 F

## 2024-12-30 DIAGNOSIS — I26.09 OTHER ACUTE PULMONARY EMBOLISM WITH ACUTE COR PULMONALE: ICD-10-CM

## 2024-12-30 DIAGNOSIS — I26.90 SEPTIC PULMONARY EMBOLISM WITHOUT ACUTE COR PULMONALE, UNSPECIFIED CHRONICITY: Primary | ICD-10-CM

## 2024-12-30 LAB
ALBUMIN SERPL-MCNC: 4.3 G/DL (ref 3.5–5.2)
ALBUMIN/GLOB SERPL: 1.5 G/DL
ALP SERPL-CCNC: 86 U/L (ref 39–117)
ALT SERPL W P-5'-P-CCNC: 34 U/L (ref 1–41)
ANION GAP SERPL CALCULATED.3IONS-SCNC: 12.1 MMOL/L (ref 5–15)
APTT PPP: 27.1 SECONDS (ref 26.5–34.5)
AST SERPL-CCNC: 24 U/L (ref 1–40)
BASOPHILS # BLD AUTO: 0.08 10*3/MM3 (ref 0–0.2)
BASOPHILS NFR BLD AUTO: 1.1 % (ref 0–1.5)
BILIRUB SERPL-MCNC: 0.4 MG/DL (ref 0–1.2)
BUN SERPL-MCNC: 17 MG/DL (ref 8–23)
BUN/CREAT SERPL: 16.2 (ref 7–25)
CALCIUM SPEC-SCNC: 9.1 MG/DL (ref 8.6–10.5)
CHLORIDE SERPL-SCNC: 104 MMOL/L (ref 98–107)
CO2 SERPL-SCNC: 23.9 MMOL/L (ref 22–29)
CREAT SERPL-MCNC: 1.05 MG/DL (ref 0.76–1.27)
DEPRECATED RDW RBC AUTO: 40.8 FL (ref 37–54)
EGFRCR SERPLBLD CKD-EPI 2021: 79.3 ML/MIN/1.73
EOSINOPHIL # BLD AUTO: 0.2 10*3/MM3 (ref 0–0.4)
EOSINOPHIL NFR BLD AUTO: 2.8 % (ref 0.3–6.2)
ERYTHROCYTE [DISTWIDTH] IN BLOOD BY AUTOMATED COUNT: 12.3 % (ref 12.3–15.4)
GLOBULIN UR ELPH-MCNC: 2.9 GM/DL
GLUCOSE SERPL-MCNC: 131 MG/DL (ref 65–99)
HCT VFR BLD AUTO: 42.9 % (ref 37.5–51)
HGB BLD-MCNC: 14.5 G/DL (ref 13–17.7)
IMM GRANULOCYTES # BLD AUTO: 0.01 10*3/MM3 (ref 0–0.05)
IMM GRANULOCYTES NFR BLD AUTO: 0.1 % (ref 0–0.5)
INR PPP: 1.16 (ref 0.9–1.1)
LYMPHOCYTES # BLD AUTO: 2.29 10*3/MM3 (ref 0.7–3.1)
LYMPHOCYTES NFR BLD AUTO: 32 % (ref 19.6–45.3)
MCH RBC QN AUTO: 30.8 PG (ref 26.6–33)
MCHC RBC AUTO-ENTMCNC: 33.8 G/DL (ref 31.5–35.7)
MCV RBC AUTO: 91.1 FL (ref 79–97)
MONOCYTES # BLD AUTO: 0.75 10*3/MM3 (ref 0.1–0.9)
MONOCYTES NFR BLD AUTO: 10.5 % (ref 5–12)
NEUTROPHILS NFR BLD AUTO: 3.83 10*3/MM3 (ref 1.7–7)
NEUTROPHILS NFR BLD AUTO: 53.5 % (ref 42.7–76)
NRBC BLD AUTO-RTO: 0 /100 WBC (ref 0–0.2)
PLATELET # BLD AUTO: 278 10*3/MM3 (ref 140–450)
PMV BLD AUTO: 9.3 FL (ref 6–12)
POTASSIUM SERPL-SCNC: 3.9 MMOL/L (ref 3.5–5.2)
PROT SERPL-MCNC: 7.2 G/DL (ref 6–8.5)
PROTHROMBIN TIME: 14.9 SECONDS (ref 12.1–14.7)
RBC # BLD AUTO: 4.71 10*6/MM3 (ref 4.14–5.8)
SODIUM SERPL-SCNC: 140 MMOL/L (ref 136–145)
WBC NRBC COR # BLD AUTO: 7.16 10*3/MM3 (ref 3.4–10.8)

## 2024-12-30 PROCEDURE — 86147 CARDIOLIPIN ANTIBODY EA IG: CPT | Performed by: INTERNAL MEDICINE

## 2024-12-30 PROCEDURE — 85613 RUSSELL VIPER VENOM DILUTED: CPT | Performed by: INTERNAL MEDICINE

## 2024-12-30 PROCEDURE — 85610 PROTHROMBIN TIME: CPT | Performed by: INTERNAL MEDICINE

## 2024-12-30 PROCEDURE — 85025 COMPLETE CBC W/AUTO DIFF WBC: CPT | Performed by: INTERNAL MEDICINE

## 2024-12-30 PROCEDURE — 85732 THROMBOPLASTIN TIME PARTIAL: CPT | Performed by: INTERNAL MEDICINE

## 2024-12-30 PROCEDURE — 85306 CLOT INHIBIT PROT S FREE: CPT | Performed by: INTERNAL MEDICINE

## 2024-12-30 PROCEDURE — 85302 CLOT INHIBIT PROT C ANTIGEN: CPT | Performed by: INTERNAL MEDICINE

## 2024-12-30 PROCEDURE — 81291 MTHFR GENE: CPT | Performed by: INTERNAL MEDICINE

## 2024-12-30 PROCEDURE — 85300 ANTITHROMBIN III ACTIVITY: CPT | Performed by: INTERNAL MEDICINE

## 2024-12-30 PROCEDURE — 85301 ANTITHROMBIN III ANTIGEN: CPT | Performed by: INTERNAL MEDICINE

## 2024-12-30 PROCEDURE — 86146 BETA-2 GLYCOPROTEIN ANTIBODY: CPT | Performed by: INTERNAL MEDICINE

## 2024-12-30 PROCEDURE — 85303 CLOT INHIBIT PROT C ACTIVITY: CPT | Performed by: INTERNAL MEDICINE

## 2024-12-30 PROCEDURE — 83090 ASSAY OF HOMOCYSTEINE: CPT | Performed by: INTERNAL MEDICINE

## 2024-12-30 PROCEDURE — 85730 THROMBOPLASTIN TIME PARTIAL: CPT | Performed by: INTERNAL MEDICINE

## 2024-12-30 PROCEDURE — 85305 CLOT INHIBIT PROT S TOTAL: CPT | Performed by: INTERNAL MEDICINE

## 2024-12-30 PROCEDURE — 80053 COMPREHEN METABOLIC PANEL: CPT | Performed by: INTERNAL MEDICINE

## 2024-12-30 RX ORDER — CHOLECALCIFEROL (VITAMIN D3) 50 MCG
2000 TABLET ORAL 2 TIMES DAILY
COMMUNITY
Start: 2016-04-21 | End: 2024-12-30

## 2024-12-30 RX ORDER — APIXABAN 5 MG/1
5 TABLET, FILM COATED ORAL EVERY 12 HOURS SCHEDULED
COMMUNITY
Start: 2024-12-24

## 2024-12-30 NOTE — PROGRESS NOTES
Venipuncture Blood Specimen Collection  Venipuncture performed in Right arm by Christa Garay MA with good hemostasis. Patient tolerated the procedure well without complications.   12/30/24   Christa Garay MA

## 2024-12-31 LAB
B2 GLYCOPROT1 IGA SER-ACNC: <9 GPI IGA UNITS (ref 0–25)
B2 GLYCOPROT1 IGG SER-ACNC: <9 GPI IGG UNITS (ref 0–20)
B2 GLYCOPROT1 IGM SER-ACNC: <9 GPI IGM UNITS (ref 0–32)
HCYS SERPL-MCNC: 11.3 UMOL/L (ref 0–15)

## 2025-01-01 LAB
AT III ACT/NOR PPP CHRO: 136 % (ref 75–135)
AT III AG ACT/NOR PPP IA: 81 % (ref 72–124)
PROT C ACT/NOR PPP: 127 % (ref 73–180)
PROT C AG ACT/NOR PPP IA: 110 % (ref 60–150)
PROT S ACT/NOR PPP: 112 % (ref 63–140)
PROT S AG ACT/NOR PPP IA: 79 % (ref 60–150)
PROT S FREE AG ACT/NOR PPP IA: 114 % (ref 61–136)

## 2025-01-02 ENCOUNTER — PATIENT ROUNDING (BHMG ONLY) (OUTPATIENT)
Dept: ONCOLOGY | Facility: CLINIC | Age: 65
End: 2025-01-02
Payer: COMMERCIAL

## 2025-01-02 LAB
CARDIOLIPIN IGG SER IA-ACNC: <9 GPL U/ML (ref 0–14)
CARDIOLIPIN IGM SER IA-ACNC: <9 MPL U/ML (ref 0–12)
LA 2 SCREEN W REFLEX-IMP: NORMAL
SCREEN APTT: 30.5 SEC (ref 0–43.5)
SCREEN DRVVT: 42.9 SEC (ref 0–47)

## 2025-01-03 LAB
IMP & REVIEW OF LAB RESULTS: NORMAL
MTHFR GENE MUT ANL BLD/T: NORMAL

## 2025-01-15 NOTE — PROGRESS NOTES
Don Tavarez  [id number]  1960 1/21/2025      Referring Provider:   Jay Jay Goodman PA-C    Reason for Follow Up:   Pulmonary Embolism (PE)     Chief Complaint:  Pulmonary Embolism (PE)      History of Present Illness   Don Tavarez is a very pleasant 64 y.o.  male who presents in follow up appointment for further management and evaluation of pulmonary embolism.    He presented to [facility] on 9/27/24 with right sided pleuritic chest pain that had been occurring for a day prior. While he denied of any shortness of breath he experienced pleuritic chest pain. CT PE protocol showed large pulmonary embolism in the right pulmonary artery and was started on heparin drip. TTE obtained to further evaluate which noted normal LVEF, mild concentric left ventricular hypertrophy, grade 1 diastolic dysfunction. Bilateral lower extremity venous duplex. He was discharged on Eliquis and his symptoms have resolved. He denies of any prolonged period of immobilization, no trauma or hospitalizations. He denies of any No recent prolonged immobilization or surgeries. Denies of any COVID infection or vaccine prior to PE. He is a non-smoker and has no personal or family history that he is aware for thrombosis.     Interim History:  Mr. Tavarez presents for follow up and denies of any changes since his last visit. He has been tolerating Eliquis well without any side effects or bleeding.             The following portions of the patient's history were reviewed and updated as appropriate: allergies, current medications, past family history, past medical history, past social history, past surgical history and problem list.      No Known Allergies        Past Medical History:   Diagnosis Date    Arthritis     History of blood clots     Hypertension          Past Surgical History:   Procedure Laterality Date    JOINT REPLACEMENT Left 10/2015    Dr Nesbitt in [place]    JOINT REPLACEMENT Right 04/2014    Dr Nesbitt in  [place]         Social History     Socioeconomic History    Marital status:    Tobacco Use    Smoking status: Never     Passive exposure: Never    Smokeless tobacco: Never   Vaping Use    Vaping status: Never Used   Substance and Sexual Activity    Alcohol use: Never    Drug use: Never    Sexual activity: Defer           Family History   Problem Relation Age of Onset    Congenital heart disease Mother     Heart attack Father     Cancer Colon Sister 50s    Cancer Colon Sister 60s    Heart attack Brother     Cancer Lung Brother 60s         Current Outpatient Medications:     Cholecalciferol 25 MCG (1000 UT) tablet, Take 2 tablets by mouth Daily., Disp: , Rfl:     diclofenac (VOLTAREN) 50 MG EC tablet, Take 1 tablet by mouth 2 (Two) Times a Day., Disp: , Rfl:     Eliquis 5 MG tablet tablet, Take 1 tablet by mouth Every 12 (Twelve) Hours., Disp: , Rfl:     lisinopril (PRINIVIL,ZESTRIL) 20 MG tablet, Take 1 tablet by mouth Daily., Disp: , Rfl:         Review of Systems  Pertinent positives are listed as per history of present of illness, all other systems reviewed and are negative.          Physical Exam  Vital Signs: These were reviewed and listed as per patient’s electronic medical chart  Vitals:    01/21/25 0837   BP: 141/83   Pulse: 92   Resp: 18   Temp: 97.6 °F (36.4 °C)   SpO2: 98%     General: Patient is awake, alert, and in no acute distress.  Head: Normocephalic, atraumatic  Eyes: EOMI. Conjunctivae and sclerae normal.  Ears: Ears appear intact with no abnormalities noted.   Neck: Trachea midline. No obvious JVD.  Lungs: Respirations appear to be regular, even and unlabored with no signs of respiratory distress. No audible wheezing.  Abdomen: No obvious abdominal distension.  MS: Muscle tone appears normal. No gross deformities.  Extremities: No clubbing, cyanosis or edema noted.  Skin: No visible bleeding, bruising, or rash.  Neurologic: Alert and oriented x3. No gross focal deficits.        Pain  Score:  Pain Score    01/21/25 0837   PainSc: 0-No pain             PHQ-Score Total:  PHQ-9 Total Score:          IMAGING:  CT Angiogram Chest Pulmonary Embolism (09/27/2024 10:40)   FINDINGS: Large filling defect in the right main pulmonary artery extending superiorly and inferiorly compatible with large right pulmonary embolus..Minimal consolidation peripherally in the right upper lobe which could represent postobstructive process..There is no mediastinal lymph node enlargement. No pericardial or pleural effusion.  IMPRESSION:1. Large pulmonary embolus right main pulmonary artery. 2. Results were relayed to the referring clinician.        LABS/STUDIES:  Consult on 12/30/2024   Component Date Value    Protime 12/30/2024 14.9 (H)     INR 12/30/2024 1.16 (H)     PTT 12/30/2024 27.1     Homocysteine, Plasma (Qu* 12/30/2024 11.3     AntiThromb III Func 12/30/2024 136 (H)     Antithrombin Antigen 12/30/2024 81     Protein C Antigen 12/30/2024 110     Protein S Ag, Total 12/30/2024 79     Protein S Ag, Free 12/30/2024 114     Protein C-Functional 12/30/2024 127     Protein S-Functional 12/30/2024 112     Glucose 12/30/2024 131 (H)     BUN 12/30/2024 17     Creatinine 12/30/2024 1.05     Sodium 12/30/2024 140     Potassium 12/30/2024 3.9     Chloride 12/30/2024 104     CO2 12/30/2024 23.9     Calcium 12/30/2024 9.1     Total Protein 12/30/2024 7.2     Albumin 12/30/2024 4.3     ALT (SGPT) 12/30/2024 34     AST (SGOT) 12/30/2024 24     Alkaline Phosphatase 12/30/2024 86     Total Bilirubin 12/30/2024 0.4     Globulin 12/30/2024 2.9     A/G Ratio 12/30/2024 1.5     BUN/Creatinine Ratio 12/30/2024 16.2     Anion Gap 12/30/2024 12.1     eGFR 12/30/2024 79.3     PTT Lupus Anticoagulant 12/30/2024 30.5     Dilute Viper Venom Time 12/30/2024 42.9     Lupus Anticoagulant Refl* 12/30/2024 Comment:     Beta-2 Glyco 1 IgG 12/30/2024 <9     Beta-2 Glyco 1 IgA 12/30/2024 <9     Beta-2 Glyco 1 IgM 12/30/2024 <9     Anticardiolipin IgG  12/30/2024 <9     Anticardiolipin IgM 12/30/2024 <9     MTHFR 12/30/2024 Comment     REVIEWED BY 12/30/2024 Comment     WBC 12/30/2024 7.16     RBC 12/30/2024 4.71     Hemoglobin 12/30/2024 14.5     Hematocrit 12/30/2024 42.9     MCV 12/30/2024 91.1     MCH 12/30/2024 30.8     MCHC 12/30/2024 33.8     RDW 12/30/2024 12.3     RDW-SD 12/30/2024 40.8     MPV 12/30/2024 9.3     Platelets 12/30/2024 278     Neutrophil % 12/30/2024 53.5     Lymphocyte % 12/30/2024 32.0     Monocyte % 12/30/2024 10.5     Eosinophil % 12/30/2024 2.8     Basophil % 12/30/2024 1.1     Immature Grans % 12/30/2024 0.1     Neutrophils, Absolute 12/30/2024 3.83     Lymphocytes, Absolute 12/30/2024 2.29     Monocytes, Absolute 12/30/2024 0.75     Eosinophils, Absolute 12/30/2024 0.20     Basophils, Absolute 12/30/2024 0.08     Immature Grans, Absolute 12/30/2024 0.01     nRBC 12/30/2024 0.0    No results displayed because visit has over 200 results.            PATHOLOGY:   12/30/24          Assessment & Plan   Don Tavarez is a very pleasant 64 y.o.  male who presents in follow up appointment for further management and evaluation of pulmonary embolism.    Right pulmonary embolism  Homozygous MTHFR C677T  - Given that it appears unprovoked patient had a partial hypercoagulable workup as an inpatient. Factor V Leiden and Prothrombin gene mutations are negative and full workup was sent.   - Protein C&S antigen and activity panels are normal. Antithrombin III activity level is increased, however, this is of no known clinical significance and Eliquis can lead to elevated antithrombin activity levels . Lupus anticoagulant, beta 2 glycoprotein, and anticardiolipin antibodies to assess for antiphospholipid syndrome are negative. The results of the MTHFR DNA analysis shows that he has two copies of the C677T mutation however homocysteine level is normal. MTHFR C677T homozygous mutation can cause homocystinemia which can then increase risk for  cardiovascular disease, thrombosis, and pregnancy complications. Although not well understood it is thought that the MTHFR mutations by themselves, in the absence of elevated homocysteine levels, are not a risk factor for cardiovascular disease, DVT and PE, or pregnancy complications. We discussed genetic risk and he will pass on on the MTHFR C677T mutations to his children and he should discuss his history with them in the event they would like to get tested via their PCP.   - Regardless of workup would recommend lifelong anticoagulation given that it was unprovoked unless contraindication should exist. I spent a long time today discussing the risks and benefits of lifelong anticoagulation and he is agreeable.  We also discussed full dose versus prophylactic anticoagulation and he would like to continue with full dose anticoagulation.  - He understands the risk of bleeding while being on anticoagulation and should spontaneous or abnormal bleeding occur he was advised to seek immediate medical attention. Recommended against NSAID use or high impact activities while on anticoagulation, as this places him at increased risk for bleeding. I have also recommended buying a medical band stating that he is on anticoagulation so that if he were unconscious or in an accident, this would alert medical personal.   - He was also advised of possible thrombosis symptoms (which include symptoms of myocardial infarction, CVA, DVT, and pulmonary emboli) and understands that should this occur he should also seek immediate medical attention.    ACO / ADRIANE/Other  Quality measures  -  Don Tavarez  reports that he has never smoked. He has never been exposed to tobacco smoke. He has never used smokeless tobacco.   -  Don Tavarez did not receive 2024 flu vaccine.  This was recommended but declined.  -  Don Tavarez reports a pain score of 0.  Given his pain assessment as noted, treatment options were discussed and the following  options were decided upon as a follow-up plan to address the patient's pain: continuation of current treatment plan for pain.  -  Current outpatient and discharge medications have been reconciled for the patient.  Reviewed by: Nasrin Bradley MD      I will have the patient return in follow up appointment to review test results in six months with labs (CBC, CMP, homocysteine level). He understands that should he have any questions or concerns prior to his appointment he should give us a call at any time and I would be happy to see him sooner. It was a pleasure to see this patient in clinic today, thank you for allowing me to participate in the care of this patient.      Nasrin Bradley MD   01/21/25   09:03 EST

## 2025-01-21 ENCOUNTER — OFFICE VISIT (OUTPATIENT)
Dept: ONCOLOGY | Facility: CLINIC | Age: 65
End: 2025-01-21
Payer: COMMERCIAL

## 2025-01-21 VITALS
HEIGHT: 68 IN | HEART RATE: 92 BPM | WEIGHT: 231.6 LBS | BODY MASS INDEX: 35.1 KG/M2 | RESPIRATION RATE: 18 BRPM | SYSTOLIC BLOOD PRESSURE: 141 MMHG | OXYGEN SATURATION: 98 % | DIASTOLIC BLOOD PRESSURE: 83 MMHG | TEMPERATURE: 97.6 F

## 2025-01-21 DIAGNOSIS — Z15.89 HOMOZYGOUS MTHFR MUTATION C677T: ICD-10-CM

## 2025-01-21 DIAGNOSIS — I26.09 OTHER ACUTE PULMONARY EMBOLISM WITH ACUTE COR PULMONALE: Primary | ICD-10-CM

## 2025-01-21 PROCEDURE — 99214 OFFICE O/P EST MOD 30 MIN: CPT | Performed by: INTERNAL MEDICINE

## 2025-07-10 DIAGNOSIS — I26.09 OTHER ACUTE PULMONARY EMBOLISM WITH ACUTE COR PULMONALE: Primary | ICD-10-CM

## 2025-07-10 DIAGNOSIS — Z15.89 HOMOZYGOUS MTHFR MUTATION C677T: ICD-10-CM

## 2025-07-10 NOTE — PROGRESS NOTES
Don Tavarez  [id number]  1960 7/18/2025      Referring Provider:   Jay Jay Goodman PA-C    Reason for Follow Up:   Pulmonary Embolism (PE)     Chief Complaint:  Pulmonary Embolism (PE)      History of Present Illness   Don Tavarez is a very pleasant 64 y.o.  male who presents in follow up appointment for further management and evaluation of pulmonary embolism.    He presented to [facility] on 9/27/24 with right sided pleuritic chest pain that had been occurring for a day prior. While he denied of any shortness of breath he experienced pleuritic chest pain. CT PE protocol showed large pulmonary embolism in the right pulmonary artery and was started on heparin drip. TTE obtained to further evaluate which noted normal LVEF, mild concentric left ventricular hypertrophy, grade 1 diastolic dysfunction. Bilateral lower extremity venous duplex. He was discharged on Eliquis and his symptoms have resolved. He denies of any prolonged period of immobilization, no trauma or hospitalizations. He denies of any No recent prolonged immobilization or surgeries. Denies of any COVID infection or vaccine prior to PE. He is a non-smoker and has no personal or family history that he is aware for thrombosis.     Interim History:  Mr. Tavarez presents for follow up and denies of any changes since his last visit. He has been tolerating Eliquis well without any side effects. Denies of any bleeding or further thrombosis symptoms.             The following portions of the patient's history were reviewed and updated as appropriate: allergies, current medications, past family history, past medical history, past social history, past surgical history and problem list.      No Known Allergies        Past Medical History:   Diagnosis Date    Arthritis     History of blood clots     Hypertension          Past Surgical History:   Procedure Laterality Date    JOINT REPLACEMENT Left 10/2015    Dr Nesbitt in [place]    JOINT  REPLACEMENT Right 04/2014    Dr Nesbitt in [place]         Social History     Socioeconomic History    Marital status:    Tobacco Use    Smoking status: Never     Passive exposure: Never    Smokeless tobacco: Never   Vaping Use    Vaping status: Never Used   Substance and Sexual Activity    Alcohol use: Never    Drug use: Never    Sexual activity: Defer           Family History   Problem Relation Age of Onset    Congenital heart disease Mother     Heart attack Father     Cancer Colon Sister 50s    Cancer Colon Sister 60s    Heart attack Brother     Cancer Lung Brother 60s         Current Outpatient Medications:     Cholecalciferol 25 MCG (1000 UT) tablet, Take 2 tablets by mouth Daily., Disp: , Rfl:     diclofenac (VOLTAREN) 50 MG EC tablet, Take 1 tablet by mouth 2 (Two) Times a Day., Disp: , Rfl:     Eliquis 5 MG tablet tablet, Take 1 tablet by mouth Every 12 (Twelve) Hours., Disp: , Rfl:     lisinopril (PRINIVIL,ZESTRIL) 20 MG tablet, Take 1 tablet by mouth Daily., Disp: , Rfl:         Review of Systems  Pertinent positives are listed as per history of present of illness, all other systems reviewed and are negative. No bleeding.           Physical Exam  Vital Signs: These were reviewed and listed as per patient’s electronic medical chart  Vitals:    07/18/25 0818   BP: 155/91   Pulse: 85   Resp: 16   Temp: 97.3 °F (36.3 °C)   SpO2: 95%     General: Awake, alert and oriented, in no distress  HEENT: Head is atraumatic, normocephalic, extraocular movements full, oropharynx clear, no scleral icterus, pink moist mucous membranes  Neck: Supple, no JVD, lymphadenopathy or masses  Cardiovascular: Regular rate and rhythm without murmurs, rubs or gallops  Pulmonary: Nonlabored, clear to auscultation bilaterally, no wheezing  Abdomen: Soft, nontender, nondistended, normal active bowel sounds present, no organomegaly  Extremities: No clubbing, cyanosis or edema  Lymph: No cervical, supraclavicular  adenopathy  Neurologic: Mental status as above, alert, awake and oriented, grossly nonfocal exam  Skin: Warm, dry, intact          Pain Score:  Pain Score    07/18/25 0818   PainSc: 0-No pain           PHQ-Score Total:  PHQ-9 Total Score:          IMAGING:  CT Angiogram Chest Pulmonary Embolism (09/27/2024 10:40)   FINDINGS: Large filling defect in the right main pulmonary artery extending superiorly and inferiorly compatible with large right pulmonary embolus..Minimal consolidation peripherally in the right upper lobe which could represent postobstructive process..There is no mediastinal lymph node enlargement. No pericardial or pleural effusion.  IMPRESSION:1. Large pulmonary embolus right main pulmonary artery. 2. Results were relayed to the referring clinician.        LABS/STUDIES:  Lab on 07/18/2025   Component Date Value    Glucose 07/18/2025 101 (H)     BUN 07/18/2025 17.7     Creatinine 07/18/2025 0.99     Sodium 07/18/2025 140     Potassium 07/18/2025 4.9     Chloride 07/18/2025 106     CO2 07/18/2025 22.2     Calcium 07/18/2025 8.9     Total Protein 07/18/2025 6.8     Albumin 07/18/2025 4.1     ALT (SGPT) 07/18/2025 33     AST (SGOT) 07/18/2025 26     Alkaline Phosphatase 07/18/2025 78     Total Bilirubin 07/18/2025 0.5     Globulin 07/18/2025 2.7     A/G Ratio 07/18/2025 1.5     BUN/Creatinine Ratio 07/18/2025 17.9     Anion Gap 07/18/2025 11.8     eGFR 07/18/2025 85.1     WBC 07/18/2025 7.34     RBC 07/18/2025 4.51     Hemoglobin 07/18/2025 13.9     Hematocrit 07/18/2025 41.4     MCV 07/18/2025 91.8     MCH 07/18/2025 30.8     MCHC 07/18/2025 33.6     RDW 07/18/2025 12.1 (L)     RDW-SD 07/18/2025 40.3     MPV 07/18/2025 8.9     Platelets 07/18/2025 240     Neutrophil % 07/18/2025 45.2     Lymphocyte % 07/18/2025 39.5     Monocyte % 07/18/2025 10.8     Eosinophil % 07/18/2025 3.0     Basophil % 07/18/2025 1.1     Immature Grans % 07/18/2025 0.4     Neutrophils, Absolute 07/18/2025 3.32     Lymphocytes,  Absolute 07/18/2025 2.90     Monocytes, Absolute 07/18/2025 0.79     Eosinophils, Absolute 07/18/2025 0.22     Basophils, Absolute 07/18/2025 0.08     Immature Grans, Absolute 07/18/2025 0.03     nRBC 07/18/2025 0.0          PATHOLOGY:   12/30/24          Assessment & Plan   Don Tavarez is a very pleasant 64 y.o.  male who presents in follow up appointment for further management and evaluation of pulmonary embolism.    Right pulmonary embolism  Homozygous MTHFR C677T  - Given that it appears unprovoked patient had a partial hypercoagulable workup as an inpatient. Factor V Leiden and Prothrombin gene mutations are negative and full workup was sent.   - Protein C&S antigen and activity panels are normal. Antithrombin III activity level is increased, however, this is of no known clinical significance and Eliquis can lead to elevated antithrombin activity levels . Lupus anticoagulant, beta 2 glycoprotein, and anticardiolipin antibodies to assess for antiphospholipid syndrome are negative. The results of the MTHFR DNA analysis shows that he has two copies of the C677T mutation however homocysteine level is normal. MTHFR C677T homozygous mutation can cause homocystinemia which can then increase risk for cardiovascular disease, thrombosis, and pregnancy complications. Although not well understood it is thought that the MTHFR mutations by themselves, in the absence of elevated homocysteine levels, are not a risk factor for cardiovascular disease, DVT and PE, or pregnancy complications. We discussed genetic risk and he will pass on on the MTHFR C677T mutations to his children and he should discuss his history with them in the event they would like to get tested via their PCP.   - Regardless of workup would recommend lifelong anticoagulation given that it was unprovoked unless contraindication should exist. I spent a long time today discussing the risks and benefits of lifelong anticoagulation and he is agreeable.   We also discussed full dose versus prophylactic anticoagulation and he would like to continue with full dose anticoagulation.  - He understands the risk of bleeding while being on anticoagulation and should spontaneous or abnormal bleeding occur he was advised to seek immediate medical attention. Recommended against NSAID use or high impact activities while on anticoagulation, as this places him at increased risk for bleeding. I have also recommended buying a medical band stating that he is on anticoagulation so that if he were unconscious or in an accident, this would alert medical personal.   - He was also advised of possible thrombosis symptoms (which include symptoms of myocardial infarction, CVA, DVT, and pulmonary emboli) and understands that should this occur he should also seek immediate medical attention.    ACO / ADRIANE/Other  Quality measures  -  Don Tavarez  reports that he has never smoked. He has never been exposed to tobacco smoke. He has never used smokeless tobacco.   -  Don Tavarez did not receive 2024 flu vaccine.  This was recommended but declined.  -  Don Tavarez reports a pain score of 0.  Given his pain assessment as noted, treatment options were discussed and the following options were decided upon as a follow-up plan to address the patient's pain: continuation of current treatment plan for pain.  -  Current outpatient and discharge medications have been reconciled for the patient.  Reviewed by: Nasrin Bradley MD      I will have the patient return in follow up appointment to review test results in one year with labs (CBC, CMP, homocysteine level). If thereafter labs are stable he can continue to follow with his PCP. He understands that should he have any questions or concerns prior to his appointment he should give us a call at any time and I would be happy to see him sooner. It was a pleasure to see this patient in clinic today, thank you for allowing me to participate in the care  of this patient.      Nasrin Bradley MD   07/18/25   15:00 EDT

## 2025-07-18 ENCOUNTER — OFFICE VISIT (OUTPATIENT)
Dept: ONCOLOGY | Facility: CLINIC | Age: 65
End: 2025-07-18
Payer: COMMERCIAL

## 2025-07-18 ENCOUNTER — LAB (OUTPATIENT)
Dept: ONCOLOGY | Facility: CLINIC | Age: 65
End: 2025-07-18
Payer: COMMERCIAL

## 2025-07-18 VITALS
HEART RATE: 85 BPM | HEIGHT: 68 IN | TEMPERATURE: 97.3 F | DIASTOLIC BLOOD PRESSURE: 91 MMHG | BODY MASS INDEX: 35.19 KG/M2 | RESPIRATION RATE: 16 BRPM | WEIGHT: 232.2 LBS | SYSTOLIC BLOOD PRESSURE: 155 MMHG | OXYGEN SATURATION: 95 %

## 2025-07-18 DIAGNOSIS — I26.09 OTHER ACUTE PULMONARY EMBOLISM WITH ACUTE COR PULMONALE: ICD-10-CM

## 2025-07-18 DIAGNOSIS — I26.09 OTHER ACUTE PULMONARY EMBOLISM WITH ACUTE COR PULMONALE: Primary | ICD-10-CM

## 2025-07-18 DIAGNOSIS — Z15.89 HOMOZYGOUS MTHFR MUTATION C677T: ICD-10-CM

## 2025-07-18 LAB
ALBUMIN SERPL-MCNC: 4.1 G/DL (ref 3.5–5.2)
ALBUMIN/GLOB SERPL: 1.5 G/DL
ALP SERPL-CCNC: 78 U/L (ref 39–117)
ALT SERPL W P-5'-P-CCNC: 33 U/L (ref 1–41)
ANION GAP SERPL CALCULATED.3IONS-SCNC: 11.8 MMOL/L (ref 5–15)
AST SERPL-CCNC: 26 U/L (ref 1–40)
BASOPHILS # BLD AUTO: 0.08 10*3/MM3 (ref 0–0.2)
BASOPHILS NFR BLD AUTO: 1.1 % (ref 0–1.5)
BILIRUB SERPL-MCNC: 0.5 MG/DL (ref 0–1.2)
BUN SERPL-MCNC: 17.7 MG/DL (ref 8–23)
BUN/CREAT SERPL: 17.9 (ref 7–25)
CALCIUM SPEC-SCNC: 8.9 MG/DL (ref 8.6–10.5)
CHLORIDE SERPL-SCNC: 106 MMOL/L (ref 98–107)
CO2 SERPL-SCNC: 22.2 MMOL/L (ref 22–29)
CREAT SERPL-MCNC: 0.99 MG/DL (ref 0.76–1.27)
DEPRECATED RDW RBC AUTO: 40.3 FL (ref 37–54)
EGFRCR SERPLBLD CKD-EPI 2021: 85.1 ML/MIN/1.73
EOSINOPHIL # BLD AUTO: 0.22 10*3/MM3 (ref 0–0.4)
EOSINOPHIL NFR BLD AUTO: 3 % (ref 0.3–6.2)
ERYTHROCYTE [DISTWIDTH] IN BLOOD BY AUTOMATED COUNT: 12.1 % (ref 12.3–15.4)
GLOBULIN UR ELPH-MCNC: 2.7 GM/DL
GLUCOSE SERPL-MCNC: 101 MG/DL (ref 65–99)
HCT VFR BLD AUTO: 41.4 % (ref 37.5–51)
HCYS SERPL-MCNC: 12 UMOL/L (ref 0–15)
HGB BLD-MCNC: 13.9 G/DL (ref 13–17.7)
IMM GRANULOCYTES # BLD AUTO: 0.03 10*3/MM3 (ref 0–0.05)
IMM GRANULOCYTES NFR BLD AUTO: 0.4 % (ref 0–0.5)
LYMPHOCYTES # BLD AUTO: 2.9 10*3/MM3 (ref 0.7–3.1)
LYMPHOCYTES NFR BLD AUTO: 39.5 % (ref 19.6–45.3)
MCH RBC QN AUTO: 30.8 PG (ref 26.6–33)
MCHC RBC AUTO-ENTMCNC: 33.6 G/DL (ref 31.5–35.7)
MCV RBC AUTO: 91.8 FL (ref 79–97)
MONOCYTES # BLD AUTO: 0.79 10*3/MM3 (ref 0.1–0.9)
MONOCYTES NFR BLD AUTO: 10.8 % (ref 5–12)
NEUTROPHILS NFR BLD AUTO: 3.32 10*3/MM3 (ref 1.7–7)
NEUTROPHILS NFR BLD AUTO: 45.2 % (ref 42.7–76)
NRBC BLD AUTO-RTO: 0 /100 WBC (ref 0–0.2)
PLATELET # BLD AUTO: 240 10*3/MM3 (ref 140–450)
PMV BLD AUTO: 8.9 FL (ref 6–12)
POTASSIUM SERPL-SCNC: 4.9 MMOL/L (ref 3.5–5.2)
PROT SERPL-MCNC: 6.8 G/DL (ref 6–8.5)
RBC # BLD AUTO: 4.51 10*6/MM3 (ref 4.14–5.8)
SODIUM SERPL-SCNC: 140 MMOL/L (ref 136–145)
WBC NRBC COR # BLD AUTO: 7.34 10*3/MM3 (ref 3.4–10.8)

## 2025-07-18 PROCEDURE — 99213 OFFICE O/P EST LOW 20 MIN: CPT | Performed by: INTERNAL MEDICINE

## 2025-07-18 PROCEDURE — 83090 ASSAY OF HOMOCYSTEINE: CPT | Performed by: INTERNAL MEDICINE

## 2025-07-18 PROCEDURE — 80053 COMPREHEN METABOLIC PANEL: CPT | Performed by: INTERNAL MEDICINE

## 2025-07-18 PROCEDURE — 85025 COMPLETE CBC W/AUTO DIFF WBC: CPT | Performed by: INTERNAL MEDICINE

## 2025-07-18 NOTE — PROGRESS NOTES
Venipuncture Blood Specimen Collection  Venipuncture performed in Left Arm by Dinesh Molina MA with good hemostasis. Patient tolerated the procedure well without complications.   07/18/25   Dinesh Molina MA